# Patient Record
Sex: FEMALE | Race: WHITE | Employment: FULL TIME | URBAN - METROPOLITAN AREA
[De-identification: names, ages, dates, MRNs, and addresses within clinical notes are randomized per-mention and may not be internally consistent; named-entity substitution may affect disease eponyms.]

---

## 2017-06-24 ENCOUNTER — HOSPITAL ENCOUNTER (EMERGENCY)
Age: 22
Discharge: HOME | End: 2017-06-24
Attending: STUDENT IN AN ORGANIZED HEALTH CARE EDUCATION/TRAINING PROGRAM
Payer: COMMERCIAL

## 2017-06-24 ENCOUNTER — APPOINTMENT (OUTPATIENT)
Dept: GENERAL RADIOLOGY | Age: 22
End: 2017-06-24
Attending: STUDENT IN AN ORGANIZED HEALTH CARE EDUCATION/TRAINING PROGRAM
Payer: COMMERCIAL

## 2017-06-24 VITALS
TEMPERATURE: 98 F | OXYGEN SATURATION: 97 % | WEIGHT: 215 LBS | HEART RATE: 75 BPM | RESPIRATION RATE: 16 BRPM | HEIGHT: 62 IN | DIASTOLIC BLOOD PRESSURE: 80 MMHG | SYSTOLIC BLOOD PRESSURE: 114 MMHG | BODY MASS INDEX: 39.56 KG/M2

## 2017-06-24 DIAGNOSIS — M25.562 ACUTE PAIN OF LEFT KNEE: Primary | ICD-10-CM
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
## 2017-06-24 DIAGNOSIS — T14.8XXA ABRASION: ICD-10-CM

## 2017-06-24 LAB — HCG UR QL: NEGATIVE

## 2017-06-24 PROCEDURE — 73562 X-RAY EXAM OF KNEE 3: CPT

## 2017-06-24 PROCEDURE — 99283 EMERGENCY DEPT VISIT LOW MDM: CPT | Performed by: NURSE PRACTITIONER

## 2017-06-24 PROCEDURE — 81025 URINE PREGNANCY TEST: CPT

## 2017-06-24 NOTE — ED NOTES
Pt refused knee immobilizer, has one she would rather use. Crutches given with instruction. I have reviewed discharge instructions with the patient. The patient verbalized understanding.

## 2017-06-24 NOTE — ED PROVIDER NOTES
HPI Comments: 21 y/o f w hsx hip dysplasia and frequent falls to ed after her right ankle gave way and she landed on her left knee onto concrete curb. No other injury. Now with pain left knee. No right ankle pain. No other complaints. lmp due now    Patient is a 20 y.o. female presenting with knee injury. The history is provided by the patient. No  was used. Knee Injury    This is a new problem. The current episode started 3 to 5 hours ago. The problem occurs constantly. The problem has not changed since onset. The pain is present in the left knee. Pain severity now: mild to moderate. Associated symptoms include limited range of motion and stiffness. Pertinent negatives include no numbness, no tingling, no itching, no back pain and no neck pain. There has been a history of trauma.         Past Medical History:   Diagnosis Date    Anemia     Asthma     proair as needed; uses rarely    Broken bones     \"lots\" falls often    Hip dysplasia     bilateral and knee dysplasia    Ovarian cyst     cystadenofibroma    Unspecified adverse effect of anesthesia 2010    \"Went into cardiac arrest after waking up from anesthesia\"       Past Surgical History:   Procedure Laterality Date    HX KNEE ARTHROSCOPY Left     MCL; PCL    HX KNEE ARTHROSCOPY Right 1/5/15    MCL; PCL, filled micro fractures    HX PELVIC LAPAROSCOPY Right 5/6/2015    right ovarian cystectomy,chromopertubation,pelvic washings    HX WISDOM TEETH EXTRACTION           Family History:   Problem Relation Age of Onset    Cancer Mother      cervical    Endometriosis Mother     Hypertension Mother     Diabetes Mother      prediabetes    Diabetes Maternal Grandmother     Breast Cancer Maternal Grandmother [de-identified]    Ovarian Cancer Maternal Grandmother 36    Endometriosis Maternal Grandmother     Diabetes Maternal Grandfather     Diabetes Paternal Grandmother     Cancer Paternal Grandmother      uterine    Endometriosis Paternal Grandmother     Diabetes Paternal Grandfather     Cancer Paternal Grandfather      small cell ca    Other Paternal Grandfather      polio    Diabetes Maternal Aunt        Social History     Social History    Marital status:      Spouse name: N/A    Number of children: N/A    Years of education: N/A     Occupational History    Not on file. Social History Main Topics    Smoking status: Never Smoker    Smokeless tobacco: Never Used    Alcohol use No    Drug use: No    Sexual activity: Yes     Partners: Male     Birth control/ protection: Condom     Other Topics Concern    Not on file     Social History Narrative         ALLERGIES: Morphine; Shellfish containing products; Shellfish derived; Codeine; Doxycycline; Lortab [hydrocodone-acetaminophen]; Pcn [penicillins]; Strawberry; Tramadol; and Zithromax [azithromycin]    Review of Systems   Constitutional: Negative for chills and fever. HENT: Negative for ear pain and mouth sores. Eyes: Negative for discharge and redness. Respiratory: Negative for cough and shortness of breath. Cardiovascular: Negative for chest pain and palpitations. Gastrointestinal: Negative for abdominal pain, nausea and vomiting. Endocrine: Negative for cold intolerance and heat intolerance. Genitourinary: Negative for difficulty urinating and dysuria. Musculoskeletal: Positive for gait problem, joint swelling and stiffness. Negative for back pain and neck pain. Skin: Positive for wound (small superficial abrasion left knee). Negative for color change and itching. Neurological: Negative for dizziness, tingling, numbness and headaches. Psychiatric/Behavioral: Negative for confusion and decreased concentration. Vitals:    06/24/17 1130   BP: 114/80   Pulse: 75   Resp: 16   Temp: 98 °F (36.7 °C)   SpO2: 97%   Weight: 97.5 kg (215 lb)   Height: 5' 2\" (1.575 m)            Physical Exam   Constitutional: She is oriented to person, place, and time.  She appears well-developed and well-nourished. No distress. HENT:   Head: Normocephalic and atraumatic. Right Ear: External ear normal.   Left Ear: External ear normal.   Nose: Nose normal.   Eyes: Conjunctivae and EOM are normal. Pupils are equal, round, and reactive to light. Neck: Normal range of motion. Neck supple. Cardiovascular: Normal rate, regular rhythm and normal heart sounds. Pulmonary/Chest: Effort normal and breath sounds normal. No respiratory distress. She has no wheezes. Abdominal: Soft. Bowel sounds are normal. She exhibits no distension. There is no tenderness. Musculoskeletal: She exhibits edema and tenderness. Left knee: She exhibits swelling. Tenderness found. Medial joint line tenderness noted. Legs:  Neurological: She is alert and oriented to person, place, and time. No cranial nerve deficit. Coordination normal.   Skin: Skin is warm and dry. No rash noted. Psychiatric: She has a normal mood and affect. Her behavior is normal. Judgment and thought content normal.   Nursing note and vitals reviewed. MDM  Number of Diagnoses or Management Options  Diagnosis management comments: 19 y/o f w hip dysplasia to ed after fall onto her left knee - refuses offer of pain meds now. I tried to help her up to take a few steps and she has severe pain with wt bearing. Will eval xray. She follows with [facility] for her ortho needs  1:29 PM  Xray is neg. Will dc home with crutches and immobilizer and to see Dr Anais Simpson Monday.        Amount and/or Complexity of Data Reviewed  Tests in the radiology section of CPT®: ordered and reviewed    Risk of Complications, Morbidity, and/or Mortality  Presenting problems: minimal  Diagnostic procedures: minimal  Management options: minimal    Patient Progress  Patient progress: stable    ED Course       Procedures

## 2017-06-24 NOTE — DISCHARGE INSTRUCTIONS
Knee Pain or Injury: Care Instructions  Your Care Instructions    Injuries are a common cause of knee problems. Sudden (acute) injuries may be caused by a direct blow to the knee. They can also be caused by abnormal twisting, bending, or falling on the knee. Pain, bruising, or swelling may be severe, and may start within minutes of the injury. Overuse is another cause of knee pain. Other causes are climbing stairs, kneeling, and other activities that use the knee. Everyday wear and tear, especially as you get older, also can cause knee pain. Rest, along with home treatment, often relieves pain and allows your knee to heal. If you have a serious knee injury, you may need tests and treatment. Follow-up care is a key part of your treatment and safety. Be sure to make and go to all appointments, and call your doctor if you are having problems. It's also a good idea to know your test results and keep a list of the medicines you take. How can you care for yourself at home? · Be safe with medicines. Read and follow all instructions on the label. ¨ If the doctor gave you a prescription medicine for pain, take it as prescribed. ¨ If you are not taking a prescription pain medicine, ask your doctor if you can take an over-the-counter medicine. · Rest and protect your knee. Take a break from any activity that may cause pain. · Put ice or a cold pack on your knee for 10 to 20 minutes at a time. Put a thin cloth between the ice and your skin. · Prop up a sore knee on a pillow when you ice it or anytime you sit or lie down for the next 3 days. Try to keep it above the level of your heart. This will help reduce swelling. · If your knee is not swollen, you can put moist heat, a heating pad, or a warm cloth on your knee. · If your doctor recommends an elastic bandage, sleeve, or other type of support for your knee, wear it as directed.   · Follow your doctor's instructions about how much weight you can put on your leg. Use a cane, crutches, or a walker as instructed. · Follow your doctor's instructions about activity during your healing process. If you can do mild exercise, slowly increase your activity. · Reach and stay at a healthy weight. Extra weight can strain the joints, especially the knees and hips, and make the pain worse. Losing even a few pounds may help. When should you call for help? Call 911 anytime you think you may need emergency care. For example, call if:  · You have symptoms of a blood clot in your lung (called a pulmonary embolism). These may include:  ¨ Sudden chest pain. ¨ Trouble breathing. ¨ Coughing up blood. Call your doctor now or seek immediate medical care if:  · You have severe or increasing pain. · Your leg or foot turns cold or changes color. · You cannot stand or put weight on your knee. · Your knee looks twisted or bent out of shape. · You cannot move your knee. · You have signs of infection, such as:  ¨ Increased pain, swelling, warmth, or redness. ¨ Red streaks leading from the knee. ¨ Pus draining from a place on your knee. ¨ A fever. · You have signs of a blood clot in your leg (called a deep vein thrombosis), such as:  ¨ Pain in your calf, back of the knee, thigh, or groin. ¨ Redness and swelling in your leg or groin. Watch closely for changes in your health, and be sure to contact your doctor if:  · You have tingling, weakness, or numbness in your knee. · You have any new symptoms, such as swelling. · You have bruises from a knee injury that last longer than 2 weeks. · You do not get better as expected. Where can you learn more? Go to http://micaela-dariel.info/. Enter K195 in the search box to learn more about \"Knee Pain or Injury: Care Instructions. \"  Current as of: March 20, 2017  Content Version: 11.3  © 9202-1747 [employer].  Care instructions adapted under license by [employer] (which disclaims liability or warranty for this information). If you have questions about a medical condition or this instruction, always ask your healthcare professional. [address] any warranty or liability for your use of this information. Learning About RICE (Rest, Ice, Compression, and Elevation)  What is RICE? RICE is a way to care for an injury. RICE helps relieve pain and swelling. It may also help with healing and flexibility. RICE stands for:  · Rest and protect the injured or sore area. · Ice or a cold pack used as soon as possible. · Compression, or wrapping the injured or sore area with an elastic bandage. · Elevation (propping up) the injured or sore area. How do you do RICE? You can use RICE for home treatment when you have general aches and pains or after an injury or surgery. Rest  · Do not put weight on the injury for at least 24 to 48 hours. · Use crutches for a badly sprained knee or ankle. · Support a sprained wrist, elbow, or shoulder with a sling. Ice  · Put ice or a cold pack on the injury right away to reduce pain and swelling. Frozen vegetables will also work as an ice pack. Put a thin cloth between the ice or cold pack and your skin. The cloth protects the injured area from getting too cold. · Use ice for 10 to 15 minutes at a time for the first 48 to 72 hours. Compression  · Use compression for sprains, strains, and surgeries of the arms and legs. · Wrap the injured area with an elastic bandage or compression sleeve to reduce swelling. · Don't wrap it too tightly. If the area below it feels numb, tingles, or feels cool, loosen the wrap. Elevation  · Use elevation for areas of the body that can be propped up, such as arms and legs. · Prop up the injured area on pillows whenever you use ice. Keep it propped up anytime you sit or lie down. · Try to keep the injured area at or above the level of your heart. This will help reduce swelling and bruising. Where can you learn more?   Go to http://micaela-dariel.info/. Enter B451 in the search box to learn more about \"Learning About RICE (Rest, Ice, Compression, and Elevation). \"  Current as of: March 21, 2017  Content Version: 11.3  © 8959-0210 [employer]. Care instructions adapted under license by [employer]â„[employer] (which disclaims liability or warranty for this information). If you have questions about a medical condition or this instruction, always ask your healthcare professional. [place] [address] any warranty or liability for your use of this information.

## 2017-12-22 ENCOUNTER — HOSPITAL ENCOUNTER (EMERGENCY)
Age: 22
Discharge: HOME | End: 2017-12-22
Attending: EMERGENCY MEDICINE
Payer: COMMERCIAL

## 2017-12-22 VITALS
HEIGHT: 62 IN | HEART RATE: 102 BPM | BODY MASS INDEX: 39.38 KG/M2 | SYSTOLIC BLOOD PRESSURE: 110 MMHG | TEMPERATURE: 99 F | OXYGEN SATURATION: 99 % | WEIGHT: 214 LBS | DIASTOLIC BLOOD PRESSURE: 66 MMHG | RESPIRATION RATE: 16 BRPM

## 2017-12-22 DIAGNOSIS — R11.2 NON-INTRACTABLE VOMITING WITH NAUSEA, UNSPECIFIED VOMITING TYPE: Primary | ICD-10-CM

## 2017-12-22 LAB
ANION GAP SERPL CALC-SCNC: 14 MMOL/L (ref 7–16)
BACTERIA URNS QL MICRO: ABNORMAL /HPF
BUN SERPL-MCNC: 10 MG/DL (ref 6–23)
CALCIUM SERPL-MCNC: 9.1 MG/DL (ref 8.3–10.4)
CASTS URNS QL MICRO: 0 /LPF
CHLORIDE SERPL-SCNC: 100 MMOL/L (ref 98–107)
CO2 SERPL-SCNC: 23 MMOL/L (ref 21–32)
CREAT SERPL-MCNC: 0.83 MG/DL (ref 0.6–1)
CRYSTALS URNS QL MICRO: 0 /LPF
EPI CELLS #/AREA URNS HPF: ABNORMAL /HPF
GLUCOSE SERPL-MCNC: 100 MG/DL (ref 65–100)
MUCOUS THREADS URNS QL MICRO: ABNORMAL /LPF
POTASSIUM SERPL-SCNC: 3.8 MMOL/L (ref 3.5–5.1)
RBC #/AREA URNS HPF: ABNORMAL /HPF
SODIUM SERPL-SCNC: 137 MMOL/L (ref 136–145)
WBC URNS QL MICRO: ABNORMAL /HPF

## 2017-12-22 PROCEDURE — 99284 EMERGENCY DEPT VISIT MOD MDM: CPT | Performed by: EMERGENCY MEDICINE

## 2017-12-22 PROCEDURE — 81015 MICROSCOPIC EXAM OF URINE: CPT | Performed by: EMERGENCY MEDICINE

## 2017-12-22 PROCEDURE — 96374 THER/PROPH/DIAG INJ IV PUSH: CPT | Performed by: EMERGENCY MEDICINE

## 2017-12-22 PROCEDURE — 74011250636 HC RX REV CODE- 250/636: Performed by: EMERGENCY MEDICINE

## 2017-12-22 PROCEDURE — 96361 HYDRATE IV INFUSION ADD-ON: CPT | Performed by: EMERGENCY MEDICINE

## 2017-12-22 PROCEDURE — 74011000250 HC RX REV CODE- 250: Performed by: EMERGENCY MEDICINE

## 2017-12-22 PROCEDURE — 81003 URINALYSIS AUTO W/O SCOPE: CPT | Performed by: EMERGENCY MEDICINE

## 2017-12-22 PROCEDURE — 80048 BASIC METABOLIC PNL TOTAL CA: CPT | Performed by: EMERGENCY MEDICINE

## 2017-12-22 RX ORDER — PROGESTERONE 100 MG/1
100 CAPSULE ORAL DAILY
COMMUNITY
End: 2018-01-12

## 2017-12-22 RX ORDER — SODIUM CHLORIDE, SODIUM LACTATE, POTASSIUM CHLORIDE, CALCIUM CHLORIDE 600; 310; 30; 20 MG/100ML; MG/100ML; MG/100ML; MG/100ML
1000 INJECTION, SOLUTION INTRAVENOUS CONTINUOUS
Status: DISCONTINUED | OUTPATIENT
Start: 2017-12-22 | End: 2017-12-22 | Stop reason: HOSPADM

## 2017-12-22 RX ORDER — PROMETHAZINE HYDROCHLORIDE 25 MG/1
25 SUPPOSITORY RECTAL
Qty: 12 SUPPOSITORY | Refills: 0 | Status: SHIPPED | OUTPATIENT
Start: 2017-12-22 | End: 2018-01-12

## 2017-12-22 RX ORDER — PROMETHAZINE HYDROCHLORIDE 25 MG/1
25 TABLET ORAL
Qty: 12 TAB | Refills: 0 | Status: SHIPPED | OUTPATIENT
Start: 2017-12-22 | End: 2018-01-12

## 2017-12-22 RX ADMIN — SODIUM CHLORIDE 12.5 MG: 9 INJECTION INTRAMUSCULAR; INTRAVENOUS; SUBCUTANEOUS at 10:58

## 2017-12-22 RX ADMIN — SODIUM CHLORIDE, SODIUM LACTATE, POTASSIUM CHLORIDE, AND CALCIUM CHLORIDE 1000 ML: 600; 310; 30; 20 INJECTION, SOLUTION INTRAVENOUS at 10:58

## 2017-12-22 RX ADMIN — SODIUM CHLORIDE, POTASSIUM CHLORIDE, SODIUM LACTATE AND CALCIUM CHLORIDE 1000 ML: 600; 310; 30; 20 INJECTION, SOLUTION INTRAVENOUS at 13:27

## 2017-12-22 NOTE — ED TRIAGE NOTES
Patient advises about 6 weeks pregnant, 2nd pregnancy and 1 live birth at 22 weeks 2 days,  at 3 days. Patient advises placenta abruptio. Patient advises started having nausea and vomiting last night, small diarrhea last night. Patient also with upper abdominal pain and generalized body aches. Patient denies any bleeding or discharge.

## 2017-12-22 NOTE — DISCHARGE INSTRUCTIONS
Nausea and Vomiting: Care Instructions  Your Care Instructions    When you are nauseated, you may feel weak and sweaty and notice a lot of saliva in your mouth. Nausea often leads to vomiting. Most of the time you do not need to worry about nausea and vomiting, but they can be signs of other illnesses. Two common causes of nausea and vomiting are stomach flu and food poisoning. Nausea and vomiting from viral stomach flu will usually start to improve within 24 hours. Nausea and vomiting from food poisoning may last from 12 to 48 hours. The doctor has checked you carefully, but problems can develop later. If you notice any problems or new symptoms, get medical treatment right away. Follow-up care is a key part of your treatment and safety. Be sure to make and go to all appointments, and call your doctor if you are having problems. It's also a good idea to know your test results and keep a list of the medicines you take. How can you care for yourself at home? · To prevent dehydration, drink plenty of fluids, enough so that your urine is light yellow or clear like water. Choose water and other caffeine-free clear liquids until you feel better. If you have kidney, heart, or liver disease and have to limit fluids, talk with your doctor before you increase the amount of fluids you drink. · Rest in bed until you feel better. · When you are able to eat, try clear soups, mild foods, and liquids until all symptoms are gone for 12 to 48 hours. Other good choices include dry toast, crackers, cooked cereal, and gelatin dessert, such as Jell-O. When should you call for help? Call 911 anytime you think you may need emergency care. For example, call if:  ? · You passed out (lost consciousness). ?Call your doctor now or seek immediate medical care if:  ? · You have symptoms of dehydration, such as:  ¨ Dry eyes and a dry mouth. ¨ Passing only a little dark urine.   ¨ Feeling thirstier than usual.   ? · You have new or worsening belly pain. ? · You have a new or higher fever. ? · You vomit blood or what looks like coffee grounds. ? Watch closely for changes in your health, and be sure to contact your doctor if:  ? · You have ongoing nausea and vomiting. ? · Your vomiting is getting worse. ? · Your vomiting lasts longer than 2 days. ? · You are not getting better as expected. Where can you learn more? Go to http://micaela-darile.info/. Enter 25 [id number] in the search box to learn more about \"Nausea and Vomiting: Care Instructions. \"  Current as of: March 20, 2017  Content Version: 11.4  © 7323-4185 [employer]. Care instructions adapted under license by [employer] (which disclaims liability or warranty for this information). If you have questions about a medical condition or this instruction, always ask your healthcare professional. [address] any warranty or liability for your use of this information.   Recent Results (from the past 8 hour(s))   METABOLIC PANEL, BASIC    Collection Time: 12/22/17 10:43 AM   Result Value Ref Range    Sodium 137 136 - 145 mmol/L    Potassium 3.8 3.5 - 5.1 mmol/L    Chloride 100 98 - 107 mmol/L    CO2 23 21 - 32 mmol/L    Anion gap 14 7 - 16 mmol/L    Glucose 100 65 - 100 mg/dL    BUN 10 6 - 23 MG/DL    Creatinine 0.83 0.6 - 1.0 MG/DL    GFR est AA >60 >60 ml/min/1.73m2    GFR est non-AA >60 >60 ml/min/1.73m2    Calcium 9.1 8.3 - 10.4 MG/DL   URINE MICROSCOPIC    Collection Time: 12/22/17 10:55 AM   Result Value Ref Range    WBC 5-10 0 /hpf    RBC 3-5 0 /hpf    Epithelial cells 5-10 0 /hpf    Bacteria 1+ (H) 0 /hpf    Casts 0 0 /lpf    Crystals, urine 0 0 /LPF    Mucus 1+ (H) 0 /lpf

## 2017-12-22 NOTE — ED NOTES
I have reviewed discharge instructions with the patient. The patient verbalized understanding. Patient left ED via Discharge Method: ambulatory to Home with  family/friend,   Opportunity for questions and clarification provided. Patient given 2 scripts. To continue your aftercare when you leave the hospital, you may receive an automated call from our care team to check in on how you are doing. This is a free service and part of our promise to provide the best care and service to meet your aftercare needs.  If you have questions, or wish to unsubscribe from this service please call [phone]. Thank you for Choosing our [facility] Emergency Department.

## 2017-12-22 NOTE — ED PROVIDER NOTES
HPI Comments: 23-year-old female presents to the emergency department with nausea and vomiting. She is a  about 6 weeks pregnant. A dinner last night. Then started vomiting. Vomited throughout the night. Now having small amount of whatever she drinks,    No diarrhea. No fever. No alleviating. She states that multiple family members are sick with nausea vomiting illness. Past Medical History:   Diagnosis Date    Anemia     Asthma     proair as needed; uses rarely    Broken bones     \"lots\" falls often    Hip dysplasia     bilateral and knee dysplasia    Ovarian cyst     cystadenofibroma    Unspecified adverse effect of anesthesia     \"Went into cardiac arrest after waking up from anesthesia\"       Past Surgical History:   Procedure Laterality Date    HX KNEE ARTHROSCOPY Left     MCL; PCL    HX KNEE ARTHROSCOPY Right 1/5/15    MCL; PCL, filled micro fractures    HX PELVIC LAPAROSCOPY Right 2015    right ovarian cystectomy,chromopertubation,pelvic washings    HX WISDOM TEETH EXTRACTION           Family History:   Problem Relation Age of Onset    Cancer Mother      cervical    Endometriosis Mother     Hypertension Mother     Diabetes Mother      prediabetes    Diabetes Maternal Grandmother     Breast Cancer Maternal Grandmother [de-identified]    Ovarian Cancer Maternal Grandmother 36    Endometriosis Maternal Grandmother     Diabetes Maternal Grandfather     Diabetes Paternal Grandmother     Cancer Paternal Grandmother      uterine    Endometriosis Paternal Grandmother     Diabetes Paternal Grandfather     Cancer Paternal Grandfather      small cell ca    Other Paternal Grandfather      polio    Diabetes Maternal Aunt        Social History     Social History    Marital status:      Spouse name: N/A    Number of children: N/A    Years of education: N/A     Occupational History    Not on file.      Social History Main Topics    Smoking status: Never Smoker    Smokeless tobacco: Never Used    Alcohol use No    Drug use: No    Sexual activity: Yes     Partners: Male     Birth control/ protection: Condom     Other Topics Concern    Not on file     Social History Narrative         ALLERGIES: Morphine; Shellfish containing products; Shellfish derived; Codeine; Doxycycline; Lortab [hydrocodone-acetaminophen]; Pcn [penicillins]; Strawberry; Tramadol; and Zithromax [azithromycin]    Review of Systems   Constitutional: Negative for chills and fever. HENT: Negative. Respiratory: Negative. Cardiovascular: Negative. Gastrointestinal: Negative. Genitourinary: Negative. Musculoskeletal: Negative. Neurological: Negative. Psychiatric/Behavioral: Negative. Vitals:    12/22/17 1022   BP: 106/74   Pulse: (!) 108   Resp: 16   Temp: 98.3 °F (36.8 °C)   SpO2: 100%   Weight: 97.1 kg (214 lb)   Height: 5' 2\" (1.575 m)            Physical Exam   Constitutional: She is oriented to person, place, and time. She appears well-developed and well-nourished. No distress. HENT:   Head: Normocephalic and atraumatic. Eyes: EOM are normal. Pupils are equal, round, and reactive to light. Cardiovascular: Normal rate and regular rhythm. Pulmonary/Chest: Breath sounds normal. No respiratory distress. She has no wheezes. Abdominal: Soft. She exhibits no distension. There is no tenderness. Musculoskeletal: Normal range of motion. Neurological: She is alert and oriented to person, place, and time. Skin: Skin is warm and dry. Psychiatric: She has a normal mood and affect. Nursing note and vitals reviewed. MDM  Number of Diagnoses or Management Options  Diagnosis management comments: Low. Nontoxic 50-year-old female with nausea vomiting little bit of diarrhea    Multiple sick family members    Abdomen is soft nontender without rebound masses or guarding    No vaginal bleeding. No vaginal discharge    We'll hydrate her. Treat her nausea.   Reassess Amount and/or Complexity of Data Reviewed  Clinical lab tests: reviewed      ED Course       Procedures

## 2017-12-28 PROBLEM — T78.1XXA ALLERGIC REACTION TO FOOD: Status: ACTIVE | Noted: 2017-12-28

## 2018-01-12 ENCOUNTER — ANESTHESIA (OUTPATIENT)
Dept: SURGERY | Age: 23
End: 2018-01-12
Payer: COMMERCIAL

## 2018-01-12 ENCOUNTER — HOSPITAL ENCOUNTER (OUTPATIENT)
Age: 23
Setting detail: OUTPATIENT SURGERY
Discharge: HOME | End: 2018-01-12
Attending: OBSTETRICS & GYNECOLOGY | Admitting: OBSTETRICS & GYNECOLOGY
Payer: COMMERCIAL

## 2018-01-12 ENCOUNTER — ANESTHESIA EVENT (OUTPATIENT)
Dept: SURGERY | Age: 23
End: 2018-01-12
Payer: COMMERCIAL

## 2018-01-12 VITALS
HEIGHT: 62 IN | HEART RATE: 83 BPM | TEMPERATURE: 97 F | RESPIRATION RATE: 16 BRPM | SYSTOLIC BLOOD PRESSURE: 103 MMHG | DIASTOLIC BLOOD PRESSURE: 61 MMHG | OXYGEN SATURATION: 100 %

## 2018-01-12 DIAGNOSIS — G89.18 POST-OP PAIN: Primary | ICD-10-CM

## 2018-01-12 PROCEDURE — 76210000016 HC OR PH I REC 1 TO 1.5 HR: Performed by: OBSTETRICS & GYNECOLOGY

## 2018-01-12 PROCEDURE — 76010000138 HC OR TIME 0.5 TO 1 HR: Performed by: OBSTETRICS & GYNECOLOGY

## 2018-01-12 PROCEDURE — 77030008579 HC TBNG UTER SUC CARD -A: Performed by: OBSTETRICS & GYNECOLOGY

## 2018-01-12 PROCEDURE — 77030018836 HC SOL IRR NACL ICUM -A: Performed by: OBSTETRICS & GYNECOLOGY

## 2018-01-12 PROCEDURE — 77030020782 HC GWN BAIR PAWS FLX 3M -B: Performed by: NURSE ANESTHETIST, CERTIFIED REGISTERED

## 2018-01-12 PROCEDURE — 77030009368: Performed by: OBSTETRICS & GYNECOLOGY

## 2018-01-12 PROCEDURE — 74011000250 HC RX REV CODE- 250

## 2018-01-12 PROCEDURE — 76210000020 HC REC RM PH II FIRST 0.5 HR: Performed by: OBSTETRICS & GYNECOLOGY

## 2018-01-12 PROCEDURE — 74011250636 HC RX REV CODE- 250/636

## 2018-01-12 PROCEDURE — 76060000032 HC ANESTHESIA 0.5 TO 1 HR: Performed by: OBSTETRICS & GYNECOLOGY

## 2018-01-12 PROCEDURE — 74011250636 HC RX REV CODE- 250/636: Performed by: ANESTHESIOLOGY

## 2018-01-12 PROCEDURE — 88305 TISSUE EXAM BY PATHOLOGIST: CPT | Performed by: OBSTETRICS & GYNECOLOGY

## 2018-01-12 PROCEDURE — 77030020143 HC AIRWY LARYN INTUB CGAS -A: Performed by: NURSE ANESTHETIST, CERTIFIED REGISTERED

## 2018-01-12 RX ORDER — NALOXONE HYDROCHLORIDE 0.4 MG/ML
0.1 INJECTION, SOLUTION INTRAMUSCULAR; INTRAVENOUS; SUBCUTANEOUS AS NEEDED
Status: DISCONTINUED | OUTPATIENT
Start: 2018-01-12 | End: 2018-01-12 | Stop reason: HOSPADM

## 2018-01-12 RX ORDER — DEXAMETHASONE SODIUM PHOSPHATE 4 MG/ML
INJECTION, SOLUTION INTRA-ARTICULAR; INTRALESIONAL; INTRAMUSCULAR; INTRAVENOUS; SOFT TISSUE AS NEEDED
Status: DISCONTINUED | OUTPATIENT
Start: 2018-01-12 | End: 2018-01-12 | Stop reason: HOSPADM

## 2018-01-12 RX ORDER — PROPOFOL 10 MG/ML
INJECTION, EMULSION INTRAVENOUS AS NEEDED
Status: DISCONTINUED | OUTPATIENT
Start: 2018-01-12 | End: 2018-01-12 | Stop reason: HOSPADM

## 2018-01-12 RX ORDER — ACETAMINOPHEN 10 MG/ML
1000 INJECTION, SOLUTION INTRAVENOUS ONCE
Status: COMPLETED | OUTPATIENT
Start: 2018-01-12 | End: 2018-01-12

## 2018-01-12 RX ORDER — LIDOCAINE HYDROCHLORIDE 20 MG/ML
INJECTION, SOLUTION EPIDURAL; INFILTRATION; INTRACAUDAL; PERINEURAL AS NEEDED
Status: DISCONTINUED | OUTPATIENT
Start: 2018-01-12 | End: 2018-01-12 | Stop reason: HOSPADM

## 2018-01-12 RX ORDER — DIPHENHYDRAMINE HYDROCHLORIDE 50 MG/ML
12.5 INJECTION, SOLUTION INTRAMUSCULAR; INTRAVENOUS ONCE
Status: DISCONTINUED | OUTPATIENT
Start: 2018-01-12 | End: 2018-01-12 | Stop reason: HOSPADM

## 2018-01-12 RX ORDER — KETOROLAC TROMETHAMINE 10 MG/1
10 TABLET, FILM COATED ORAL EVERY 6 HOURS
Qty: 10 TAB | Refills: 0 | Status: SHIPPED | OUTPATIENT
Start: 2018-01-12 | End: 2018-01-15

## 2018-01-12 RX ORDER — FENTANYL CITRATE 50 UG/ML
INJECTION, SOLUTION INTRAMUSCULAR; INTRAVENOUS AS NEEDED
Status: DISCONTINUED | OUTPATIENT
Start: 2018-01-12 | End: 2018-01-12 | Stop reason: HOSPADM

## 2018-01-12 RX ORDER — SODIUM CHLORIDE, SODIUM LACTATE, POTASSIUM CHLORIDE, CALCIUM CHLORIDE 600; 310; 30; 20 MG/100ML; MG/100ML; MG/100ML; MG/100ML
75 INJECTION, SOLUTION INTRAVENOUS CONTINUOUS
Status: DISCONTINUED | OUTPATIENT
Start: 2018-01-12 | End: 2018-01-12 | Stop reason: HOSPADM

## 2018-01-12 RX ORDER — ONDANSETRON 2 MG/ML
4 INJECTION INTRAMUSCULAR; INTRAVENOUS ONCE
Status: DISCONTINUED | OUTPATIENT
Start: 2018-01-12 | End: 2018-01-12 | Stop reason: HOSPADM

## 2018-01-12 RX ORDER — KETOROLAC TROMETHAMINE 30 MG/ML
INJECTION, SOLUTION INTRAMUSCULAR; INTRAVENOUS AS NEEDED
Status: DISCONTINUED | OUTPATIENT
Start: 2018-01-12 | End: 2018-01-12 | Stop reason: HOSPADM

## 2018-01-12 RX ORDER — PROMETHAZINE HYDROCHLORIDE 12.5 MG/1
12.5-25 TABLET ORAL
Qty: 10 TAB | Refills: 1 | Status: SHIPPED | OUTPATIENT
Start: 2018-01-12 | End: 2018-01-19

## 2018-01-12 RX ORDER — MIDAZOLAM HYDROCHLORIDE 1 MG/ML
2 INJECTION, SOLUTION INTRAMUSCULAR; INTRAVENOUS
Status: DISCONTINUED | OUTPATIENT
Start: 2018-01-12 | End: 2018-01-12 | Stop reason: HOSPADM

## 2018-01-12 RX ORDER — ONDANSETRON 2 MG/ML
INJECTION INTRAMUSCULAR; INTRAVENOUS AS NEEDED
Status: DISCONTINUED | OUTPATIENT
Start: 2018-01-12 | End: 2018-01-12 | Stop reason: HOSPADM

## 2018-01-12 RX ORDER — METHYLERGONOVINE MALEATE 0.2 MG/1
0.2 TABLET ORAL EVERY 4 HOURS
Qty: 5 TAB | Refills: 0 | Status: SHIPPED | OUTPATIENT
Start: 2018-01-12 | End: 2018-01-13

## 2018-01-12 RX ORDER — SODIUM CHLORIDE, SODIUM LACTATE, POTASSIUM CHLORIDE, CALCIUM CHLORIDE 600; 310; 30; 20 MG/100ML; MG/100ML; MG/100ML; MG/100ML
1000 INJECTION, SOLUTION INTRAVENOUS CONTINUOUS
Status: DISCONTINUED | OUTPATIENT
Start: 2018-01-12 | End: 2018-01-12 | Stop reason: HOSPADM

## 2018-01-12 RX ORDER — LIDOCAINE HYDROCHLORIDE 10 MG/ML
0.1 INJECTION INFILTRATION; PERINEURAL AS NEEDED
Status: DISCONTINUED | OUTPATIENT
Start: 2018-01-12 | End: 2018-01-12 | Stop reason: HOSPADM

## 2018-01-12 RX ORDER — DOXYCYCLINE 100 MG/1
100 TABLET ORAL 2 TIMES DAILY
Qty: 10 TAB | Refills: 0 | Status: SHIPPED | OUTPATIENT
Start: 2018-01-12 | End: 2018-01-17

## 2018-01-12 RX ORDER — SODIUM CHLORIDE 0.9 % (FLUSH) 0.9 %
5-10 SYRINGE (ML) INJECTION AS NEEDED
Status: DISCONTINUED | OUTPATIENT
Start: 2018-01-12 | End: 2018-01-12 | Stop reason: HOSPADM

## 2018-01-12 RX ORDER — FENTANYL CITRATE 50 UG/ML
100 INJECTION, SOLUTION INTRAMUSCULAR; INTRAVENOUS AS NEEDED
Status: DISCONTINUED | OUTPATIENT
Start: 2018-01-12 | End: 2018-01-12 | Stop reason: HOSPADM

## 2018-01-12 RX ORDER — SODIUM CHLORIDE 0.9 % (FLUSH) 0.9 %
5-10 SYRINGE (ML) INJECTION EVERY 8 HOURS
Status: DISCONTINUED | OUTPATIENT
Start: 2018-01-12 | End: 2018-01-12 | Stop reason: HOSPADM

## 2018-01-12 RX ADMIN — MIDAZOLAM HYDROCHLORIDE 2 MG: 1 INJECTION, SOLUTION INTRAMUSCULAR; INTRAVENOUS at 13:22

## 2018-01-12 RX ADMIN — DEXAMETHASONE SODIUM PHOSPHATE 10 MG: 4 INJECTION, SOLUTION INTRA-ARTICULAR; INTRALESIONAL; INTRAMUSCULAR; INTRAVENOUS; SOFT TISSUE at 13:53

## 2018-01-12 RX ADMIN — LIDOCAINE HYDROCHLORIDE 100 MG: 20 INJECTION, SOLUTION EPIDURAL; INFILTRATION; INTRACAUDAL; PERINEURAL at 13:49

## 2018-01-12 RX ADMIN — FENTANYL CITRATE 25 MCG: 50 INJECTION, SOLUTION INTRAMUSCULAR; INTRAVENOUS at 13:51

## 2018-01-12 RX ADMIN — FENTANYL CITRATE 25 MCG: 50 INJECTION, SOLUTION INTRAMUSCULAR; INTRAVENOUS at 13:57

## 2018-01-12 RX ADMIN — KETOROLAC TROMETHAMINE 30 MG: 30 INJECTION, SOLUTION INTRAMUSCULAR; INTRAVENOUS at 14:03

## 2018-01-12 RX ADMIN — ONDANSETRON 4 MG: 2 INJECTION INTRAMUSCULAR; INTRAVENOUS at 13:53

## 2018-01-12 RX ADMIN — ACETAMINOPHEN 1000 MG: 10 INJECTION, SOLUTION INTRAVENOUS at 14:46

## 2018-01-12 RX ADMIN — FENTANYL CITRATE 50 MCG: 50 INJECTION, SOLUTION INTRAMUSCULAR; INTRAVENOUS at 13:35

## 2018-01-12 RX ADMIN — SODIUM CHLORIDE, SODIUM LACTATE, POTASSIUM CHLORIDE, AND CALCIUM CHLORIDE 1000 ML: 600; 310; 30; 20 INJECTION, SOLUTION INTRAVENOUS at 12:48

## 2018-01-12 RX ADMIN — PROPOFOL 200 MG: 10 INJECTION, EMULSION INTRAVENOUS at 13:49

## 2018-01-12 NOTE — IP AVS SNAPSHOT
Yfn Khan 
 
 
 [address] [id number] 
[phone] Patient: Otto Dudley MRN: [id number] :1995 About your hospitalization You were admitted on:  2018 You last received care in the:  [facility] PACU You were discharged on:  2018 Why you were hospitalized Your primary diagnosis was:  Not on File Follow-up Information Follow up With Details Comments Contact Info Jen Jules MD Schedule an appointment as soon as possible for a visit in 2 week(s) CALL TO SCHEDULE FOLLOW UP APPOINTMENT IF NOT ALREADY SCHEDULED [address] [facility] [address] 
[phone] Luca Alonzo MD   [address] [place] [id number] [phone] Discharge Orders None A check rosalina indicates which time of day the medication should be taken. My Medications START taking these medications Instructions Each Dose to Equal  
 Morning Noon Evening Bedtime  
 doxycycline 100 mg tablet Commonly known as:  ADOXA Your last dose was: Your next dose is: Take 1 Tab by mouth two (2) times a day for 5 days. 100 mg  
    
   
   
   
  
 ketorolac 10 mg tablet Commonly known as:  TORADOL Your last dose was: Your next dose is: Take 1 Tab by mouth every six (6) hours for 10 doses. 10 mg  
    
   
   
   
  
 methylergonovine 0.2 mg tablet Commonly known as:  METHERGINE Your last dose was: Your next dose is: Take 1 Tab by mouth every four (4) hours for 5 doses. 0.2 mg  
    
   
   
   
  
  
CHANGE how you take these medications Instructions Each Dose to Equal  
 Morning Noon Evening Bedtime  
 promethazine 12.5 mg tablet Commonly known as:  PHENERGAN What changed:   
- medication strength 
- how much to take 
- reasons to take this - Another medication with the same name was removed. Continue taking this medication, and follow the directions you see here. Your last dose was: Your next dose is: Take 1-2 Tabs by mouth every six (6) hours as needed for Nausea for up to 7 days. 12.5-25 mg CONTINUE taking these medications Instructions Each Dose to Equal  
 Morning Noon Evening Bedtime PRENATAL #2 PO Your last dose was: Your next dose is: Take  by mouth. STOP taking these medications   
 progesterone 100 mg capsule Commonly known as:  PROMETRIUM Where to Get Your Medications Information on where to get these meds will be given to you by the nurse or doctor. ! Ask your nurse or doctor about these medications  
  doxycycline 100 mg tablet  
 ketorolac 10 mg tablet  
 methylergonovine 0.2 mg tablet  
 promethazine 12.5 mg tablet Discharge Instructions Dilation and Curettage: What to Expect at [facility] Your Recovery Dilation and curettage (D&C) is a procedure to remove tissue from the inside of the uterus. The doctor used a curved tool, called a curette, to gently scrape tissue from your uterus. You are likely to have a backache, or cramps similar to menstrual cramps, and pass small clots of blood from your vagina for the first few days. You may continue to have light vaginal bleeding for several weeks after the procedure. You will probably be able to go back to most of your normal activities in 1 or 2 days. This care sheet gives you a general idea about how long it will take for you to recover. But each person recovers at a different pace. Follow the steps below to get better as quickly as possible. How can you care for yourself at home? Activity ? · Rest when you feel tired. Getting enough sleep will help you recover. ? · Avoid strenuous activities, such as bicycle riding, jogging, weight lifting, or aerobic exercise, until your doctor says it is okay. ? · Most women are able to return to work the day after the procedure. ? · You may have some light vaginal bleeding. Wear sanitary pads if needed. Do not douche or use tampons for 2 weeks or until your doctor says it is okay. ? · Ask your doctor when it is okay for you to have sex. ? · If you could become pregnant, talk about birth control with your doctor. Do not try to become pregnant until your doctor says it is okay. Diet ? · You can eat your normal diet. If your stomach is upset, try bland, low-fat foods like plain rice, broiled chicken, toast, and yogurt. ? · Drink plenty of fluids (unless your doctor tells you not to). Medicines ? · Your doctor will tell you if and when you can restart your medicines. He or she will also give you instructions about taking any new medicines. ? · If you take blood thinners, such as warfarin (Coumadin), clopidogrel (Plavix), or aspirin, be sure to talk to your doctor. He or she will tell you if and when to start taking those medicines again. Make sure that you understand exactly what your doctor wants you to do. ? · Be safe with medicines. Take pain medicines exactly as directed. ¨ If the doctor gave you a prescription medicine for pain, take it as prescribed. ¨ If you are not taking a prescription pain medicine, ask your doctor if you can take an over-the-counter medicine. ? · If you think your pain medicine is making you sick to your stomach: 
¨ Take your medicine after meals (unless your doctor has told you not to). ¨ Ask your doctor for a different pain medicine. ? · If your doctor prescribed antibiotics, take them as directed. Do not stop taking them just because you feel better. You need to take the full course of antibiotics. Follow-up care is a key part of your treatment and safety.  Be sure to make and go to all appointments, and call your doctor if you are having problems. It's also a good idea to know your test results and keep a list of the medicines you take. When should you call for help? Call 911 anytime you think you may need emergency care. For example, call if: 
? · You passed out (lost consciousness). ? · You have chest pain, are short of breath, or cough up blood. ?Call your doctor now or seek immediate medical care if: 
? · You have bright red vaginal bleeding that soaks one or more pads in an hour, or you have large clots. ? · You have vaginal discharge that increases in amount or smells bad.  
? · You are sick to your stomach or cannot drink fluids. ? · You have pain that does not get better after you take pain medicine. ? · You cannot pass stools or gas. ? · You have symptoms of a blood clot in your leg (called a deep vein thrombosis), such as: 
¨ Pain in your calf, back of the knee, thigh, or groin. ¨ Redness and swelling in your leg. ? · You have signs of infection, such as: 
¨ Increased pain, swelling, warmth, or redness. ¨ Red streaks leading from the area. ¨ Pus draining from the area. ¨ A fever. ? Watch closely for changes in your health, and be sure to contact your doctor if you have any problems. Where can you learn more? Go to http://micaela-dariel.info/. Enter [phone] in the search box to learn more about \"Dilation and Curettage: What to Expect at Home. \" Current as of: March 16, 2017 Content Version: 11.4 © 2001-8583 Healthwise, Incorporated. Care instructions adapted under license by [employer] (which disclaims liability or warranty for this information). If you have questions about a medical condition or this instruction, always ask your healthcare professional. [address] any warranty or liability for your use of this information. Introducing [facility]! Dear Sydney Martínez: Thank you for requesting a [employer] account. Our records indicate that you already have an active [employer] account. You can access your account anytime at [email or web address] Did you know that you can access your hospital and ER discharge instructions at any time in [employer]? You can also review all of your test results from your hospital stay or ER visit. Additional Information If you have questions, please visit the Frequently Asked Questions section of the [employer] website at [email or web address] Remember, [employer] is NOT to be used for urgent needs. For medical emergencies, dial 911. Now available from your iPhone and Android! Providers Seen During Your Hospitalization Provider Specialty Primary office phone Sayra Arguello MD Obstetrics & Gynecology [phone] Your Primary Care Physician (PCP) Primary Care Physician Office Phone Office Fax 224 [address] [phone] You are allergic to the following Allergen Reactions Morphine Anaphylaxis Shellfish Containing Products Shortness of Breath Shellfish Derived Hives Swelling Codeine Hives Swelling Doxycycline Hives Nausea and Vomiting Swelling Lortab (Hydrocodone-Acetaminophen) Hives Nausea and Vomiting Pcn (Penicillins) Hives Swelling Strawberry Hives Nausea and Vomiting Tramadol Hives Zithromax (Azithromycin) Hives Swelling Recent Documentation Height OB Status Smoking Status 1.575 m Having regular periods Never Smoker Emergency Contacts Name Discharge Info Relation Home Work Mobile Casey Shepherd  Spouse [3] [phone] Patient Belongings The following personal items are in your possession at time of discharge: 
  Dental Appliances: None Please provide this summary of care documentation to your next provider. Signatures-by signing, you are acknowledging that this After Visit Summary has been reviewed with you and you have received a copy. Patient Signature:  ____________________________________________________________ Date:  ____________________________________________________________  
  
Phyliss Ghee Provider Signature:  ____________________________________________________________ Date:  ____________________________________________________________

## 2018-01-12 NOTE — BRIEF OP NOTE
BRIEF OPERATIVE NOTE    Date of Procedure: 1/12/2018   Preoperative Diagnosis: MISSED AB     Postoperative Diagnosis: MISSED AB    Procedure(s):  DILATATION AND CURETTAGE WITH SUCTION   Surgeon(s) and Role:     * Angelica Pond MD - Primary         Assistant Staff:       Surgical Staff:  Circ-1: Dandre Marti RN  Circ-2: Ginette Scales RN  Scrub Tech-1: Nanette Faith  Event Time In   Incision Start  1:54 PM   Incision Close  2:04 PM     Anesthesia: General   Estimated Blood Loss: <20cc  Specimens:   ID Type Source Tests Collected by Time Destination   1 : Products of conception Janny Oquendo MD 1/12/2018  1:58 PM Pathology      Findings: as above , see dictation #[id number]  Complications: none  Implants: * No implants in log *

## 2018-01-12 NOTE — DISCHARGE INSTRUCTIONS
Dilation and Curettage: What to Expect at Home  Your Recovery  Dilation and curettage (D&C) is a procedure to remove tissue from the inside of the uterus. The doctor used a curved tool, called a curette, to gently scrape tissue from your uterus. You are likely to have a backache, or cramps similar to menstrual cramps, and pass small clots of blood from your vagina for the first few days. You may continue to have light vaginal bleeding for several weeks after the procedure. You will probably be able to go back to most of your normal activities in 1 or 2 days. This care sheet gives you a general idea about how long it will take for you to recover. But each person recovers at a different pace. Follow the steps below to get better as quickly as possible. How can you care for yourself at home? Activity  ? · Rest when you feel tired. Getting enough sleep will help you recover. ? · Avoid strenuous activities, such as bicycle riding, jogging, weight lifting, or aerobic exercise, until your doctor says it is okay. ? · Most women are able to return to work the day after the procedure. ? · You may have some light vaginal bleeding. Wear sanitary pads if needed. Do not douche or use tampons for 2 weeks or until your doctor says it is okay. ? · Ask your doctor when it is okay for you to have sex. ? · If you could become pregnant, talk about birth control with your doctor. Do not try to become pregnant until your doctor says it is okay. Diet  ? · You can eat your normal diet. If your stomach is upset, try bland, low-fat foods like plain rice, broiled chicken, toast, and yogurt. ? · Drink plenty of fluids (unless your doctor tells you not to). Medicines  ? · Your doctor will tell you if and when you can restart your medicines. He or she will also give you instructions about taking any new medicines.    ? · If you take blood thinners, such as warfarin (Coumadin), clopidogrel (Plavix), or aspirin, be sure to talk to your doctor. He or she will tell you if and when to start taking those medicines again. Make sure that you understand exactly what your doctor wants you to do. ? · Be safe with medicines. Take pain medicines exactly as directed. ¨ If the doctor gave you a prescription medicine for pain, take it as prescribed. ¨ If you are not taking a prescription pain medicine, ask your doctor if you can take an over-the-counter medicine. ? · If you think your pain medicine is making you sick to your stomach:  ¨ Take your medicine after meals (unless your doctor has told you not to). ¨ Ask your doctor for a different pain medicine. ? · If your doctor prescribed antibiotics, take them as directed. Do not stop taking them just because you feel better. You need to take the full course of antibiotics. Follow-up care is a key part of your treatment and safety. Be sure to make and go to all appointments, and call your doctor if you are having problems. It's also a good idea to know your test results and keep a list of the medicines you take. When should you call for help? Call 911 anytime you think you may need emergency care. For example, call if:  ? · You passed out (lost consciousness). ? · You have chest pain, are short of breath, or cough up blood. ?Call your doctor now or seek immediate medical care if:  ? · You have bright red vaginal bleeding that soaks one or more pads in an hour, or you have large clots. ? · You have vaginal discharge that increases in amount or smells bad.   ? · You are sick to your stomach or cannot drink fluids. ? · You have pain that does not get better after you take pain medicine. ? · You cannot pass stools or gas. ? · You have symptoms of a blood clot in your leg (called a deep vein thrombosis), such as:  ¨ Pain in your calf, back of the knee, thigh, or groin. ¨ Redness and swelling in your leg.    ? · You have signs of infection, such as:  ¨ Increased pain, swelling, warmth, or redness. ¨ Red streaks leading from the area. ¨ Pus draining from the area. ¨ A fever. ? Watch closely for changes in your health, and be sure to contact your doctor if you have any problems. Where can you learn more? Go to http://micaela-dariel.info/. Enter [phone] in the search box to learn more about \"Dilation and Curettage: What to Expect at Home. \"  Current as of: March 16, 2017  Content Version: 11.4  © 6663-9285 [employer]. Care instructions adapted under license by [employer] (which disclaims liability or warranty for this information). If you have questions about a medical condition or this instruction, always ask your healthcare professional. [address] any warranty or liability for your use of this information.

## 2018-01-12 NOTE — H&P
Gynecology History and Physical    Name: Anabel Brambila MRN: [id number] [id number]: [id number]    YOB: 1995  Age: 20 y.o. Sex: female       Subjective:      Chief complaint:  Missed     Romana Donaldson is a 20 y.o.  female with a history of endometrial thickening. Previous workup included Ultrasound which revealed no FHM. Previous treatment measures included observation. She is admitted for Procedure(s) (LRB):  DILATATION AND CURETTAGE WITH SUCTION / 8 WEEKS 2 DAYS / AB+ (N/A). The current method of family planning is none. OB History      Para Term  AB Living    1 0 0 0 0 0    SAB TAB Ectopic Molar Multiple Live Births    0 0 0  0         Past Medical History:   Diagnosis Date    Anemia     Asthma     proair as needed; uses rarely    Broken bones     \"lots\" falls often    Hip dysplasia     bilateral and knee dysplasia    Ovarian cyst     cystadenofibroma    Unspecified adverse effect of anesthesia     \"Went into cardiac arrest after waking up from anesthesia\"     Past Surgical History:   Procedure Laterality Date    HX KNEE ARTHROSCOPY Left     MCL; PCL    HX KNEE ARTHROSCOPY Right 1/5/15    MCL; PCL, filled micro fractures    HX PELVIC LAPAROSCOPY Right 2015    right ovarian cystectomy,chromopertubation,pelvic washings    HX WISDOM TEETH EXTRACTION       Social History     Occupational History    Not on file.      Social History Main Topics    Smoking status: Never Smoker    Smokeless tobacco: Never Used    Alcohol use No    Drug use: No    Sexual activity: Yes     Partners: Male     Birth control/ protection: Condom     Family History   Problem Relation Age of Onset    Cancer Mother      cervical    Endometriosis Mother     Hypertension Mother     Diabetes Mother      prediabetes    Diabetes Maternal Grandmother     Breast Cancer Maternal Grandmother [de-identified]    Ovarian Cancer Maternal Grandmother 36    Endometriosis Maternal Grandmother     Diabetes Maternal Grandfather     Diabetes Paternal Grandmother     Cancer Paternal Grandmother      uterine    Endometriosis Paternal Grandmother     Diabetes Paternal Grandfather     Cancer Paternal Grandfather      small cell ca    Other Paternal Grandfather      polio    Diabetes Maternal Aunt         Allergies   Allergen Reactions    Morphine Anaphylaxis    Shellfish Containing Products Shortness of Breath    Shellfish Derived Hives and Swelling    Codeine Hives and Swelling    Doxycycline Hives, Nausea and Vomiting and Swelling    Lortab [Hydrocodone-Acetaminophen] Hives and Nausea and Vomiting    Pcn [Penicillins] Hives and Swelling    Strawberry Hives and Nausea and Vomiting    Tramadol Hives    Zithromax [Azithromycin] Hives and Swelling     Prior to Admission medications    Medication Sig Start Date End Date Taking? Authorizing Provider   PRENATAL VIT CALC,IRON,FOLIC (PRENATAL #2 PO) Take  by mouth. Yes Historical Provider   progesterone (PROMETRIUM) 100 mg capsule Take 100 mg by mouth daily. Yes Phys Naun, MD   promethazine (PHENERGAN) 25 mg tablet Take 1 Tab by mouth every six (6) hours as needed for up to 15 doses. 12/22/17   Brain Sinha MD   promethazine (PHENERGAN) 25 mg suppository Insert 1 Suppository into rectum every six (6) hours as needed for Nausea for up to 10 doses. 12/22/17   Brain Sinha MD        Review of Systems:  A comprehensive review of systems was negative except for that written in the History of Present Illness. Objective:     Vitals:    01/12/18 1214   BP: 113/67   Pulse: 83   Resp: 18   Temp: 99 °F (37.2 °C)   SpO2: 98%   Height: 5' 2\" (1.575 m)       Physical Exam:  Patient without distress. Heart: Regular rate and rhythm  Lung: clear to auscultation throughout lung fields, no wheezes, no rales, no rhonchi and normal respiratory effort  Abdomen: soft, nontender    Assessment:     Active Problems:    * No active hospital problems.  *     Missed  with thickened endometrium    Plan:     Procedure(s) (LRB):  DILATATION AND CURETTAGE WITH SUCTION / 8 WEEKS 2 DAYS / AB+ (N/A)  Discussed the risks of surgery including the risks of bleeding, infection, deep vein thrombosis, and surgical injuries to internal organs including but not limited to the bowels, bladder, rectum, and female reproductive organs. The patient understands the risks; any and all questions were answered to the patient's satisfaction.     Signed By:  La Nena Gordon MD     2018

## 2018-01-12 NOTE — ANESTHESIA POSTPROCEDURE EVALUATION
Post-Anesthesia Evaluation and Assessment    Patient: Sho Easley MRN: [id number]  SSN: [id number]    YOB: 1995  Age: 20 y.o. Sex: female       Cardiovascular Function/Vital Signs  Visit Vitals    /61 (BP 1 Location: Right arm, BP Patient Position: At rest)    Pulse 83    Temp 36.1 °C (97 °F)    Resp 16    Ht 5' 2\" (1.575 m)    SpO2 100%       Patient is status post general anesthesia for Procedure(s):  DILATATION AND CURETTAGE WITH SUCTION . Nausea/Vomiting: None    Postoperative hydration reviewed and adequate. Pain:  Pain Scale 1: Numeric (0 - 10) (01/12/18 1446)  Pain Intensity 1: 4 (01/12/18 1446)   Managed    Neurological Status:   Neuro (WDL): Within Defined Limits (01/12/18 1446)  Neuro  Neurologic State: Alert (01/12/18 1446)  Orientation Level: Oriented X4 (01/12/18 1446)  Cognition: Decreased attention/concentration; Follows commands (01/12/18 1412)   At baseline    Mental Status and Level of Consciousness: Arousable    Pulmonary Status:   O2 Device: Room air (01/12/18 1501)   Adequate oxygenation and airway patent    Complications related to anesthesia: None    Post-anesthesia assessment completed.  No concerns    Signed By: Sanford Onofre MD     January 12, 2018

## 2018-01-12 NOTE — PROGRESS NOTES
Patient experienced a  loss. Jose J Rashid ministered to the patient and her family. The patient's information was recorded and left for [facility]. The patient was given the materials to assist her with  her loss.      [employer]

## 2018-01-12 NOTE — ANESTHESIA PREPROCEDURE EVALUATION
Anesthetic History   No history of anesthetic complications            Review of Systems / Medical History  Patient summary reviewed and pertinent labs reviewed    Pulmonary            Asthma : well controlled       Neuro/Psych   Within defined limits           Cardiovascular  Within defined limits                Exercise tolerance: >4 METS     GI/Hepatic/Renal  Within defined limits              Endo/Other        Morbid obesity     Other Findings   Comments: \"Cardiac Arrest in PACU\" per Pt           Physical Exam    Airway  Mallampati: II  TM Distance: 4 - 6 cm         Cardiovascular    Rhythm: regular  Rate: normal         Dental  No notable dental hx       Pulmonary  Breath sounds clear to auscultation               Abdominal  GI exam deferred       Other Findings            Anesthetic Plan    ASA: 3  Anesthesia type: general          Induction: Intravenous  Anesthetic plan and risks discussed with: Patient

## 2018-01-12 NOTE — OP NOTES
[address] REPORT    Becca Paz  MR#: [id number]  : 1995  ACCOUNT #: [de-identified]   DATE OF SERVICE: 2018    PREOPERATIVE DIAGNOSIS:  Missed . POSTOPERATIVE DIAGNOSIS:  Missed . PROCEDURES PERFORMED:  Dilatation and evacuation. SURGEON:  Phil Bajwa MD    ANESTHESIA:  General.    ESTIMATED BLOOD LOSS:  Less than 20 mL. COMPLICATIONS:  None. DRAINS:  In and out catheterization. SPECIMENS REMOVED:  Products of conception to Pathology. FINDINGS:  As above. INDICATIONS:  The patient with previous ultrasound one week ago with patent fetal heart rate and repeat followup ultrasound now with no fetal heart rate and documented fetal demise. PROCEDURE:  After informed consent was obtained, the patient was taken to OR and general anesthetic administered. She was prepped and draped in sterile fashion. Weighted speculum placed in the vagina after timeout had been performed and the cervix grasped with single tooth tenaculum after uterine sounding. Circumferential dilation was performed to allow for the 7 curet to be placed up inside. Circumferential suction was performed until the uterine cry was felt in all four cavities and bleeding controlled. The patient received a couple ampules of Pitocin IV, which helped to control the bleeding also. Good hemostasis was obtained from the tenaculum marks. All instruments and sponge counts were correct x2. The patient went to recovery room in good condition and specimens to Pathology. PLAN:  Patient will go home with some Phenergan for nausea and vomiting, some doxycycline to cut down the risk of infection. PATIENT WITH HISTORY OF ALLERGY TO DOXYCYCLINE, but further discussion, this was more consistent with nausea created by taking on an empty stomach. Subsequently, will try the doxycycline to cut down infection. We will also go home with Methergine series.   ALSO HAD SOME ITCHING WITH CODEINE. We will try the Percocet, as well encouraged to take Motrin or Advil. Patient will follow back otherwise in 2 weeks and given precautions.       Bhumika Avila MD       MLS / RN  D: 01/12/2018 14:16     T: 01/12/2018 15:00  JOB #: [id number]

## 2018-09-05 PROBLEM — T78.1XXA ALLERGIC REACTION TO FOOD: Status: RESOLVED | Noted: 2017-12-28 | Resolved: 2018-09-05

## 2018-09-06 PROBLEM — O99.281 HYPOTHYROID IN PREGNANCY, ANTEPARTUM, FIRST TRIMESTER: Status: ACTIVE | Noted: 2018-09-06

## 2018-09-06 PROBLEM — O09.891 HISTORY OF PRETERM DELIVERY, CURRENTLY PREGNANT, FIRST TRIMESTER: Status: ACTIVE | Noted: 2018-09-06

## 2018-09-06 PROBLEM — Z36.82 NUCHAL TRANSLUCENCY OF FETUS ON PRENATAL ULTRASOUND: Status: ACTIVE | Noted: 2018-09-06

## 2018-09-06 PROBLEM — E03.9 HYPOTHYROID IN PREGNANCY, ANTEPARTUM, FIRST TRIMESTER: Status: ACTIVE | Noted: 2018-09-06

## 2018-09-06 PROBLEM — O99.211 OBESITY AFFECTING PREGNANCY IN FIRST TRIMESTER: Status: ACTIVE | Noted: 2018-09-06

## 2018-09-11 ENCOUNTER — ANESTHESIA EVENT (OUTPATIENT)
Dept: LABOR AND DELIVERY | Age: 23
End: 2018-09-11
Payer: COMMERCIAL

## 2018-09-11 ENCOUNTER — HOSPITAL ENCOUNTER (OUTPATIENT)
Age: 23
LOS: 1 days | Discharge: HOME | End: 2018-09-11
Attending: OBSTETRICS & GYNECOLOGY | Admitting: OBSTETRICS & GYNECOLOGY
Payer: COMMERCIAL

## 2018-09-11 ENCOUNTER — ANESTHESIA (OUTPATIENT)
Dept: LABOR AND DELIVERY | Age: 23
End: 2018-09-11
Payer: COMMERCIAL

## 2018-09-11 VITALS
SYSTOLIC BLOOD PRESSURE: 100 MMHG | BODY MASS INDEX: 39.75 KG/M2 | TEMPERATURE: 97.5 F | RESPIRATION RATE: 18 BRPM | DIASTOLIC BLOOD PRESSURE: 55 MMHG | HEIGHT: 62 IN | WEIGHT: 216 LBS | OXYGEN SATURATION: 100 % | HEART RATE: 70 BPM

## 2018-09-11 DIAGNOSIS — Z36.82 NUCHAL TRANSLUCENCY OF FETUS ON PRENATAL ULTRASOUND: ICD-10-CM

## 2018-09-11 DIAGNOSIS — O09.891 HISTORY OF PRETERM DELIVERY, CURRENTLY PREGNANT, FIRST TRIMESTER: ICD-10-CM

## 2018-09-11 DIAGNOSIS — O34.31 CERVICAL INSUFFICIENCY DURING PREGNANCY IN FIRST TRIMESTER, ANTEPARTUM: ICD-10-CM

## 2018-09-11 DIAGNOSIS — O99.281 HYPOTHYROID IN PREGNANCY, ANTEPARTUM, FIRST TRIMESTER: ICD-10-CM

## 2018-09-11 DIAGNOSIS — E03.9 HYPOTHYROID IN PREGNANCY, ANTEPARTUM, FIRST TRIMESTER: ICD-10-CM

## 2018-09-11 DIAGNOSIS — O99.211 OBESITY AFFECTING PREGNANCY IN FIRST TRIMESTER: ICD-10-CM

## 2018-09-11 PROCEDURE — 76060000032 HC ANESTHESIA 0.5 TO 1 HR: Performed by: OBSTETRICS & GYNECOLOGY

## 2018-09-11 PROCEDURE — 74011258636 HC RX REV CODE- 258/636: Performed by: OBSTETRICS & GYNECOLOGY

## 2018-09-11 PROCEDURE — 74011000250 HC RX REV CODE- 250: Performed by: OBSTETRICS & GYNECOLOGY

## 2018-09-11 PROCEDURE — 77030020254 HC SOL INJ D5LR LACTATED RINGER

## 2018-09-11 PROCEDURE — 76210000064 HC RECOV POST SURG EA 0.5 HR: Performed by: OBSTETRICS & GYNECOLOGY

## 2018-09-11 PROCEDURE — 77030007880 HC KT SPN EPDRL BBMI -B: Performed by: ANESTHESIOLOGY

## 2018-09-11 PROCEDURE — 74011250637 HC RX REV CODE- 250/637: Performed by: OBSTETRICS & GYNECOLOGY

## 2018-09-11 PROCEDURE — 76010000389 HC LDRP PROCEDURE 0.5 TO 1 HR: Performed by: OBSTETRICS & GYNECOLOGY

## 2018-09-11 PROCEDURE — 74011250636 HC RX REV CODE- 250/636: Performed by: OBSTETRICS & GYNECOLOGY

## 2018-09-11 PROCEDURE — 74011000250 HC RX REV CODE- 250

## 2018-09-11 PROCEDURE — 77030003665 HC NDL SPN BBMI -A: Performed by: ANESTHESIOLOGY

## 2018-09-11 PROCEDURE — 77030002996 HC SUT SLK J&J -A: Performed by: OBSTETRICS & GYNECOLOGY

## 2018-09-11 PROCEDURE — 77030005537 HC CATH URETH BARD -A

## 2018-09-11 PROCEDURE — 77030018846 HC SOL IRR STRL H20 ICUM -A: Performed by: OBSTETRICS & GYNECOLOGY

## 2018-09-11 PROCEDURE — 77030002986 HC SUT PROL J&J -A: Performed by: OBSTETRICS & GYNECOLOGY

## 2018-09-11 PROCEDURE — 74011250636 HC RX REV CODE- 250/636: Performed by: ANESTHESIOLOGY

## 2018-09-11 PROCEDURE — 59320 REVISION OF CERVIX: CPT | Performed by: OBSTETRICS & GYNECOLOGY

## 2018-09-11 PROCEDURE — 74011250637 HC RX REV CODE- 250/637: Performed by: ANESTHESIOLOGY

## 2018-09-11 PROCEDURE — 74011250636 HC RX REV CODE- 250/636

## 2018-09-11 RX ORDER — ONDANSETRON 2 MG/ML
8 INJECTION INTRAMUSCULAR; INTRAVENOUS
Status: DISCONTINUED | OUTPATIENT
Start: 2018-09-11 | End: 2018-09-11 | Stop reason: HOSPADM

## 2018-09-11 RX ORDER — DEXTROSE, SODIUM CHLORIDE, SODIUM LACTATE, POTASSIUM CHLORIDE, AND CALCIUM CHLORIDE 5; .6; .31; .03; .02 G/100ML; G/100ML; G/100ML; G/100ML; G/100ML
100 INJECTION, SOLUTION INTRAVENOUS CONTINUOUS
Status: DISCONTINUED | OUTPATIENT
Start: 2018-09-11 | End: 2018-09-11 | Stop reason: HOSPADM

## 2018-09-11 RX ORDER — SODIUM CHLORIDE 0.9 % (FLUSH) 0.9 %
5-10 SYRINGE (ML) INJECTION EVERY 8 HOURS
Status: DISCONTINUED | OUTPATIENT
Start: 2018-09-11 | End: 2018-09-11 | Stop reason: HOSPADM

## 2018-09-11 RX ORDER — PHENAZOPYRIDINE HYDROCHLORIDE 95 MG/1
190 TABLET ORAL 3 TIMES DAILY
Qty: 12 TAB | Refills: 0 | Status: SHIPPED | OUTPATIENT
Start: 2018-09-11 | End: 2018-09-13

## 2018-09-11 RX ORDER — PHENAZOPYRIDINE HYDROCHLORIDE 95 MG/1
190 TABLET ORAL 3 TIMES DAILY
Status: DISCONTINUED | OUTPATIENT
Start: 2018-09-11 | End: 2018-09-11 | Stop reason: HOSPADM

## 2018-09-11 RX ORDER — ZOLPIDEM TARTRATE 5 MG/1
5 TABLET ORAL
Status: DISCONTINUED | OUTPATIENT
Start: 2018-09-11 | End: 2018-09-11 | Stop reason: HOSPADM

## 2018-09-11 RX ORDER — FAMOTIDINE 20 MG/1
20 TABLET, FILM COATED ORAL ONCE
Status: COMPLETED | OUTPATIENT
Start: 2018-09-11 | End: 2018-09-11

## 2018-09-11 RX ORDER — NALOXONE HYDROCHLORIDE 0.4 MG/ML
0.2 INJECTION, SOLUTION INTRAMUSCULAR; INTRAVENOUS; SUBCUTANEOUS AS NEEDED
Status: DISCONTINUED | OUTPATIENT
Start: 2018-09-11 | End: 2018-09-11 | Stop reason: HOSPADM

## 2018-09-11 RX ORDER — OXYCODONE AND ACETAMINOPHEN 10; 325 MG/1; MG/1
1 TABLET ORAL
Status: DISCONTINUED | OUTPATIENT
Start: 2018-09-11 | End: 2018-09-11 | Stop reason: HOSPADM

## 2018-09-11 RX ORDER — FAMOTIDINE 20 MG/1
20 TABLET, FILM COATED ORAL EVERY 12 HOURS
Status: DISCONTINUED | OUTPATIENT
Start: 2018-09-11 | End: 2018-09-11 | Stop reason: HOSPADM

## 2018-09-11 RX ORDER — ONDANSETRON 4 MG/1
8 TABLET, ORALLY DISINTEGRATING ORAL
Status: DISCONTINUED | OUTPATIENT
Start: 2018-09-11 | End: 2018-09-11 | Stop reason: HOSPADM

## 2018-09-11 RX ORDER — SODIUM CHLORIDE, SODIUM LACTATE, POTASSIUM CHLORIDE, CALCIUM CHLORIDE 600; 310; 30; 20 MG/100ML; MG/100ML; MG/100ML; MG/100ML
75 INJECTION, SOLUTION INTRAVENOUS CONTINUOUS
Status: DISCONTINUED | OUTPATIENT
Start: 2018-09-11 | End: 2018-09-11 | Stop reason: HOSPADM

## 2018-09-11 RX ORDER — BUPIVACAINE HYDROCHLORIDE 7.5 MG/ML
INJECTION, SOLUTION INTRASPINAL AS NEEDED
Status: DISCONTINUED | OUTPATIENT
Start: 2018-09-11 | End: 2018-09-11 | Stop reason: HOSPADM

## 2018-09-11 RX ORDER — SODIUM CHLORIDE 0.9 % (FLUSH) 0.9 %
5-10 SYRINGE (ML) INJECTION AS NEEDED
Status: DISCONTINUED | OUTPATIENT
Start: 2018-09-11 | End: 2018-09-11 | Stop reason: HOSPADM

## 2018-09-11 RX ORDER — ONDANSETRON 2 MG/ML
INJECTION INTRAMUSCULAR; INTRAVENOUS AS NEEDED
Status: DISCONTINUED | OUTPATIENT
Start: 2018-09-11 | End: 2018-09-11 | Stop reason: HOSPADM

## 2018-09-11 RX ADMIN — SODIUM CHLORIDE 12.5 MG: 9 INJECTION INTRAMUSCULAR; INTRAVENOUS; SUBCUTANEOUS at 11:41

## 2018-09-11 RX ADMIN — Medication 3 G: at 08:10

## 2018-09-11 RX ADMIN — SODIUM CHLORIDE, SODIUM LACTATE, POTASSIUM CHLORIDE, AND CALCIUM CHLORIDE: 600; 310; 30; 20 INJECTION, SOLUTION INTRAVENOUS at 08:40

## 2018-09-11 RX ADMIN — SODIUM CHLORIDE, SODIUM LACTATE, POTASSIUM CHLORIDE, AND CALCIUM CHLORIDE 1000 ML: 600; 310; 30; 20 INJECTION, SOLUTION INTRAVENOUS at 07:56

## 2018-09-11 RX ADMIN — MEPERIDINE HYDROCHLORIDE 50 MG: 50 INJECTION INTRAMUSCULAR; INTRAVENOUS; SUBCUTANEOUS at 11:41

## 2018-09-11 RX ADMIN — FAMOTIDINE 20 MG: 20 TABLET, FILM COATED ORAL at 08:10

## 2018-09-11 RX ADMIN — URINARY PAIN RELIEF 190 MG: 95 TABLET ORAL at 17:00

## 2018-09-11 RX ADMIN — OXYCODONE HYDROCHLORIDE AND ACETAMINOPHEN 1 TABLET: 10; 325 TABLET ORAL at 15:53

## 2018-09-11 RX ADMIN — MEPERIDINE HYDROCHLORIDE 50 MG: 50 INJECTION INTRAMUSCULAR; INTRAVENOUS; SUBCUTANEOUS at 14:09

## 2018-09-11 RX ADMIN — ONDANSETRON 8 MG: 4 TABLET, ORALLY DISINTEGRATING ORAL at 14:09

## 2018-09-11 RX ADMIN — ONDANSETRON 8 MG: 2 INJECTION INTRAMUSCULAR; INTRAVENOUS at 09:28

## 2018-09-11 RX ADMIN — SODIUM CHLORIDE, SODIUM LACTATE, POTASSIUM CHLORIDE, AND CALCIUM CHLORIDE 1000 ML: 600; 310; 30; 20 INJECTION, SOLUTION INTRAVENOUS at 08:20

## 2018-09-11 RX ADMIN — BUPIVACAINE HYDROCHLORIDE 1.6 ML: 7.5 INJECTION, SOLUTION INTRASPINAL at 08:53

## 2018-09-11 RX ADMIN — SODIUM CHLORIDE, SODIUM LACTATE, POTASSIUM CHLORIDE, CALCIUM CHLORIDE, AND DEXTROSE MONOHYDRATE 100 ML/HR: 600; 310; 30; 20; 5 INJECTION, SOLUTION INTRAVENOUS at 07:56

## 2018-09-11 NOTE — PROGRESS NOTES
Dr Lucretia Linares on phone report of pt in pain and nauseated. She is unable to void. Orders to give zofran 8 mg po, demerol 50 mg iv now. norco 7.5 x 1 q 4 hours.

## 2018-09-11 NOTE — ANESTHESIA PROCEDURE NOTES
Spinal Block Start time: 9/11/2018 8:48 AM 
End time: 9/11/2018 8:53 AM 
Performed by: Chilango Martin Authorized by: Chilango Martin Pre-procedure: Indications: primary anesthetic  Preanesthetic Checklist: patient identified, risks and benefits discussed, anesthesia consent, site marked, patient being monitored and timeout performed Timeout Time: 08:48 Spinal Block:  
Patient Position:  Seated Prep Region:  Lumbar Prep: chlorhexidine Location:  L3-4 Technique:  Single shot Local:  Lidocaine 1% (1 mL) Local Dose (mL):  1 Needle:  
Needle Type:  Pencan Needle Gauge:  25 G Attempts:  1 Events: CSF confirmed, no blood with aspiration and no paresthesia Assessment: 
Insertion:  Uncomplicated Patient tolerance:  Patient tolerated the procedure well with no immediate complications Anesthetic medications: 
Marcaine:  12 mg in 8.25% Dextrose

## 2018-09-11 NOTE — DISCHARGE INSTRUCTIONS
Cervical Cerclage to Prevent  Delivery: What to Expect at Home  Your Recovery  Cervical cerclage (say \"SER-vuh-kul ser-KLAZH\") is a procedure that helps keep your cervix from opening too soon. Your doctor has sewn your cervix shut to help prevent  labor. For the next few days, you may have:  · Cramping. · Spotting. · Pain when you urinate. Your doctor may give you instructions on when you can do your normal activities again, such as driving and going back to work. Your doctor will remove the stitches around your cervix at 37 weeks, or if you go into  labor or show signs of infection. This care sheet gives you a general idea about how long it will take for you to recover. But each person recovers at a different pace. Follow the steps below to get better as quickly as possible. How can you care for yourself at home? Activity    · Rest when you feel tired.     · Ask your doctor when it is okay for you to have sex. Medicines    · Be safe with medicines. Read and follow all instructions on the label.     · If you are not taking a prescription pain medicine, ask your doctor if you can take an over-the-counter medicine.     · Your doctor will tell you if and when you can restart your medicines. He or she will also give you instructions about taking any new medicines. Follow-up care is a key part of your treatment and safety. Be sure to make and go to all appointments, and call your doctor if you are having problems. It's also a good idea to know your test results and keep a list of the medicines you take. When should you call for help? Call 911 anytime you think you may need emergency care.  For example, call if:    · You have severe trouble breathing.     · You have sudden, severe pain in your belly.     · You have severe vaginal bleeding.    Call your doctor now or seek immediate medical care if:    · You have new pelvic pain, or the pain in your pelvis gets worse.     · You have a new discharge from your vagina.     · You have a new or higher fever.    Watch closely for changes in your health, and be sure to contact your doctor if you have any problems. Where can you learn more? Go to http://micaela-dariel.info/. Enter W307 in the search box to learn more about \"Cervical Cerclage to Prevent  Delivery: What to Expect at Home. \"  Current as of: 2017  Content Version: 11.7  © 3943-3468 [employer] Care instructions adapted under license by [employer] (which disclaims liability or warranty for this information). If you have questions about a medical condition or this instruction, always ask your healthcare professional. [address] any warranty or liability for your use of this information. High-Risk Pregnancy: Care Instructions  Your Care Instructions    Your pregnancy is high-risk if you or your baby has an increased risk for health problems. Many things can put you at high risk. But being at high risk does not mean that you or your baby will have a problem. Your pregnancy is high-risk if:  · You have a previous condition, such as diabetes. · Your baby has been found to have a problem, such as Down syndrome. · You have a problem during pregnancy, such as preeclampsia, or problems with the placenta-the organ that gives food and oxygen to the baby. · You had a problem in a past pregnancy. · You are younger than 16 or older than 28. · You are pregnant with twins or more. Your doctor will watch you closely to make sure that you and your baby are doing well. You may have ultrasounds or other tests to check your baby's growth. In some cases, you may have to rest at home (or in the hospital) until your baby is old enough to be born safely. If your doctor thinks that your health or your baby's health is at risk, you may have an early delivery.   Follow-up care is a key part of your treatment and safety. Be sure to make and go to all appointments, and call your doctor if you are having problems. It's also a good idea to know your test results and keep a list of the medicines you take. How can you care for yourself at home? · Go to all your prenatal visits. You will have tests for high blood pressure and for protein in your urine (both are signs of preeclampsia). Your doctor also will make sure that your baby is growing properly. · Follow your doctor's directions for activity. You may have to stop working, reduce your activity, or spend a lot of time resting (partial bed rest). You may need to do daily checks of your baby's heartbeat and your weight. If you are on bed rest:  ¨ Keep a phone, phone book, notepad, and pen near the bed where you can easily reach them. ¨ Gently stretch your legs every hour to keep good blood circulation. ¨ Do quiet activities such as reading, craft projects, or writing letters. · If you are told to take medicine, such as medicine for high blood pressure, take your medicine exactly as prescribed. Call your doctor if you have any problems with your medicine. · Follow your doctor's advice for diet and other tips for a healthy pregnancy. Rest when you need it, eat well, and drink plenty of water. If you are not on partial bed rest, do mild exercise (such as walking) if your doctor says it is okay. · Do not smoke. Smoking can harm your baby's growth and health. If you need help quitting, talk to your doctor about stop-smoking programs and medicines. These can increase your chances of quitting for good. · Do not drink alcohol. Alcohol can cause problems in the growing baby. · Avoid tobacco smoke, alcohol and drugs, chemicals, and radiation (such as from X-rays). Stay away from people who have colds and other infections. Where can you learn more? Go to http://micaela-dariel.info/.   Gia Lies in the search box to learn more about \"High-Risk Pregnancy: Care Instructions. \"  Current as of: November 21, 2017  Content Version: 11.7  © 4610-7732 [employer], [facility]. Care instructions adapted under license by [employer] (which disclaims liability or warranty for this information). If you have questions about a medical condition or this instruction, always ask your healthcare professional. [place] [address] any warranty or liability for your use of this information.

## 2018-09-11 NOTE — OP NOTES
[facility] Cervical Cerclage Procedure Note  Pre-operative Diagnosis: Cervical Incompetence  Post-operative Diagnosis: Cervical Incompetence  Surgeon:  Renetta Bonilla MD   Anesthesia: Spinal  Procedure: Procedure(s):  CERCLAGE  Indications:  Rachael Cogan is a  female with a clinical history consistent with cervical incompetence. Cervical Cerclage was offered for treatment of cervical incompetence and the risks were explained in detail in the office and again in the hospital prior to surgery. These included risk of infection, bleeding, bladder and bowel damage and pregnancy loss, along with rupture of membranes now or later in pregnancy. These complications were all explained in detail and questions answered. It was explained to the patient that she had the option of expectant management without Cerclage placement. She understood that her care and treatment would not be affected by her choice and there was no pressure on her to choose this course of treatment. After a long discussion and clear understanding by the patient, the patient consented to the procedure prior to coming to the hospital and, again on admission to the hospital.   Procedure Details:   Rachael Cogan was taken to the Operating Room where spinal anesthetic was administered. The patient was then placed in the dorsal lithotomy position. The vulva and distal vagina were then gently prepped with Betadine solution and draped in a sterile fashion to expose the vaginal introitus. The patient was then placed in Trendelenburg position to allow better visualization of the vaginal canal and the cervix. An I and O  catheter was used to drain the urine from the bladder. Using retractors, the cervix and distal vagina were visualized. The vaginal portion of the cervical length was normal.  The external cervical os appeared to be open but the internal os was closed to manual exam. Fetal membranes were not visualized.   The cervix and distal vagina were again gently washed carefully with Betadine solution and dried with sterile sponges. A long atraumatic Allis clamp was used to retract the cervix by grasping a significant portio of the cervix, carefully placed to avoid membranes at the 10 o'clock position. Using 5mm Mersaline, the first bite of the Johnston stitch was placed at the 12 o'clock position on the cervix at the junction of the vaginal mucosa and the portio of the cervix with the suture placed through the cervical stroma, careful to avoid cervical canal and amniotic sac, between mucosa and cervical canal.  This procedure was repeated in 5  bites in a purse string fashion with sequential grasping and releasing of the cervix with the Allis clamp to retract the cervical stroma and allow for placement of suture at the junction of the vaginal mucosa and the portio of the cervix. Care was used in grasping the cervix to be atraumatic and to cause as little of trauma and bleeding as possible. The last stitch ended at approximately the 12 o'clock again, needle cut and both sutures were gently retracted to take up any slack in the suture and a finger placed in the cervix and retraction of both ends of suture and internal os noted to be closed with suture tension. The suture was evaluated visually in all quadrants and felt to be at the junction of the vaginal mucosa and the portio of the cervix without including any sidewall and without including bladder or bowel. The suture was then tied with 7 square knots. The cervix was checked again and visualized and the cervix remained pink with no significantly bleeding, internal os palpated and was tightly closed. A 2-0 silk suture was place through both ends of suture approximately 1 cm distal to knots ant then tied.   An air knot, approximately two cm in length was placed with three knots at the end of the air knot to allow grasping of the suture at the time of removal.   Cervix and vagina were again evaluated and no bleeding was noted. The cervix remained pink. Instruments and retractors were removed. At the end of the procedure, sponge, instrument and needle counts were noted to be correct. Estimated Blood Loss:  Minimal  Suture Type: 5mm Mersaline  Number of Sutures: 1  Findings:  Normal cervix with multiparous os, large ectropion. Cerclage placed without complication.   Signed By: Chana Tucker MD 9/11/2018

## 2018-09-11 NOTE — PROGRESS NOTES
Dr Jay Mann on phone report of pt feeling much better now and wants to go home . Orders to discharge with his cell phone. He will call in her meds.

## 2018-09-11 NOTE — IP AVS SNAPSHOT
Naz Gallagher 
 
 
 [address] [place] [address] 
[phone] Patient: Memo Álvarez MRN: [id number] :1995 About your hospitalization You were admitted on:  2018 You last received care in the:  [facility] ANTEPARTUM You were discharged on:  2018 Why you were hospitalized Your primary diagnosis was:  Not on File Your diagnoses also included:  Cervical Insufficiency During Pregnancy In First Trimester, Antepartum Follow-up Information None Your Scheduled Appointments 2018  8:00 AM EDT ANATOMY LIMITED EARLY with [facility] ULTRASOUND 2  
[facility] MATERNAL FETAL MEDICINE ([address] [place] [address]) [address] [place] [id number]  
[phone] 2018  9:00 AM EDT  
OB VISIT with Sukhi Yang MD  
[address] [place] [address] ([address] [place] [address]) [address] [place] [id number] [phone] Discharge Orders None A check rosalina indicates which time of day the medication should be taken. My Medications ASK your doctor about these medications Instructions Each Dose to Equal  
 Morning Noon Evening Bedtime  
 aspirin 81 mg chewable tablet Your last dose was: Your next dose is: Take 81 mg by mouth daily. 81 mg MONISTAT 7 2 % vaginal cream  
Generic drug:  miconazole Your last dose was: Your next dose is: Insert 1 Applicator into vagina nightly. 1 Applicator PRENATAL #2 PO Your last dose was: Your next dose is: Take  by mouth. synthroid 50 mcg tablet Generic drug:  levothyroxine Your last dose was: Your next dose is: Take  by mouth Daily (before breakfast). Discharge Instructions Cervical Cerclage to Prevent  Delivery: What to Expect at [facility] Your Recovery Cervical cerclage (say \"SER-vuh-kul ser-KLAZH\") is a procedure that helps keep your cervix from opening too soon. Your doctor has sewn your cervix shut to help prevent  labor. For the next few days, you may have: · Cramping. · Spotting. · Pain when you urinate. Your doctor may give you instructions on when you can do your normal activities again, such as driving and going back to work. Your doctor will remove the stitches around your cervix at 37 weeks, or if you go into  labor or show signs of infection. This care sheet gives you a general idea about how long it will take for you to recover. But each person recovers at a different pace. Follow the steps below to get better as quickly as possible. How can you care for yourself at home? Activity 
  · Rest when you feel tired.  
  · Ask your doctor when it is okay for you to have sex. Medicines 
  · Be safe with medicines. Read and follow all instructions on the label.  
  · If you are not taking a prescription pain medicine, ask your doctor if you can take an over-the-counter medicine.  
  · Your doctor will tell you if and when you can restart your medicines. He or she will also give you instructions about taking any new medicines. Follow-up care is a key part of your treatment and safety. Be sure to make and go to all appointments, and call your doctor if you are having problems. It's also a good idea to know your test results and keep a list of the medicines you take. When should you call for help? Call 911 anytime you think you may need emergency care. For example, call if: 
  · You have severe trouble breathing.  
  · You have sudden, severe pain in your belly.  
  · You have severe vaginal bleeding.  
 Call your doctor now or seek immediate medical care if:   · You have new pelvic pain, or the pain in your pelvis gets worse.  
  · You have a new discharge from your vagina.  
  · You have a new or higher fever.  
 Watch closely for changes in your health, and be sure to contact your doctor if you have any problems. Where can you learn more? Go to http://micaela-dariel.info/. Enter D681 in the search box to learn more about \"Cervical Cerclage to Prevent  Delivery: What to Expect at Home. \" Current as of: 2017 Content Version: 11.7 © 2696-5944 [employer]. Care instructions adapted under license by [employer] (which disclaims liability or warranty for this information). If you have questions about a medical condition or this instruction, always ask your healthcare professional. [address] any warranty or liability for your use of this information. High-Risk Pregnancy: Care Instructions Your Care Instructions Your pregnancy is high-risk if you or your baby has an increased risk for health problems. Many things can put you at high risk. But being at high risk does not mean that you or your baby will have a problem. Your pregnancy is high-risk if: 
· You have a previous condition, such as diabetes. · Your baby has been found to have a problem, such as Down syndrome. · You have a problem during pregnancy, such as preeclampsia, or problems with the placenta-the organ that gives food and oxygen to the baby. · You had a problem in a past pregnancy. · You are younger than 16 or older than 28. · You are pregnant with twins or more. Your doctor will watch you closely to make sure that you and your baby are doing well. You may have ultrasounds or other tests to check your baby's growth. In some cases, you may have to rest at home (or in the hospital) until your baby is old enough to be born safely.  If your doctor thinks that your health or your baby's health is at risk, you may have an early delivery. Follow-up care is a key part of your treatment and safety. Be sure to make and go to all appointments, and call your doctor if you are having problems. It's also a good idea to know your test results and keep a list of the medicines you take. How can you care for yourself at home? · Go to all your prenatal visits. You will have tests for high blood pressure and for protein in your urine (both are signs of preeclampsia). Your doctor also will make sure that your baby is growing properly. · Follow your doctor's directions for activity. You may have to stop working, reduce your activity, or spend a lot of time resting (partial bed rest). You may need to do daily checks of your baby's heartbeat and your weight. If you are on bed rest: 
¨ Keep a phone, phone book, notepad, and pen near the bed where you can easily reach them. ¨ Gently stretch your legs every hour to keep good blood circulation. ¨ Do quiet activities such as reading, craft projects, or writing letters. · If you are told to take medicine, such as medicine for high blood pressure, take your medicine exactly as prescribed. Call your doctor if you have any problems with your medicine. · Follow your doctor's advice for diet and other tips for a healthy pregnancy. Rest when you need it, eat well, and drink plenty of water. If you are not on partial bed rest, do mild exercise (such as walking) if your doctor says it is okay. · Do not smoke. Smoking can harm your baby's growth and health. If you need help quitting, talk to your doctor about stop-smoking programs and medicines. These can increase your chances of quitting for good. · Do not drink alcohol. Alcohol can cause problems in the growing baby. · Avoid tobacco smoke, alcohol and drugs, chemicals, and radiation (such as from X-rays). Stay away from people who have colds and other infections. Where can you learn more? Go to http://micaela-dariel.info/. Julio C Polk in the search box to learn more about \"High-Risk Pregnancy: Care Instructions. \" Current as of: November 21, 2017 Content Version: 11.7 © 6957-3868 [employer]. Care instructions adapted under license by [employer] (which disclaims liability or warranty for this information). If you have questions about a medical condition or this instruction, always ask your healthcare professional. [address] any warranty or liability for your use of this information. Introducing [facility] & HEALTH SERVICES! Dear Justin Lopez: 
Thank you for requesting a "[employer] account. Our records indicate that you already have an active "[employer] account. You can access your account anytime at [email or web address] Did you know that you can access your hospital and ER discharge instructions at any time in "[employer]? You can also review all of your test results from your hospital stay or ER visit. Additional Information If you have questions, please visit the Frequently Asked Questions section of the "[employer] website at [email or web address] Remember, "[employer] is NOT to be used for urgent needs. For medical emergencies, dial 911. Now available from your iPhone and Android! Introducing Saw Boone As a [employer] patient, I wanted to make you aware of our electronic visit tool called Saw Boone. [employer] 24/7 allows you to connect within minutes with a medical provider 24 hours a day, seven days a week via a mobile device or tablet or logging into a secure website from your computer. You can access Saw Boone from anywhere in the [place].  
 
A virtual visit might be right for you when you have a simple condition and feel like you just dont want to get out of bed, or cant get away from work for an appointment, when your regular [facility] Mcneal provider is not available (evenings, weekends or holidays), or when youre out of town and need minor care. Electronic visits cost only $49 and if the [facility] Mcne[facility] 24/7 provider determines a prescription is needed to treat your condition, one can be electronically transmitted to a nearby pharmacy*. Please take a moment to enroll today if you have not already done so. The enrollment process is free and takes just a few minutes. To enroll, please download the Katharine Fredis 24/7 jesus to your tablet or phone, or visit [email or web address] to enroll on your computer. And, as an [address] [place] [address] patient with a [employer] account, the results of your visits will be scanned into your electronic medical record and your primary care provider will be able to view the scanned results. We urge you to continue to see your regular Katharine Mcneal provider for your ongoing medical care. And while your primary care provider may not be the one available when you seek a Saw Boone virtual visit, the peace of mind you get from getting a real diagnosis real time can be priceless. For more information on Saw Notifixiousleónfin, view our Frequently Asked Questions (FAQs) at [email or web address]. Sincerely, 
 
Christophe Pantoja MD 
Chief Medical Officer [address] *:  certain medications cannot be prescribed via Saw Boone Providers Seen During Your Hospitalization Provider Specialty Primary office phone Kirk Jimneez MD Maternal . Fetal Medicine [phone] Your Primary Care Physician (PCP) Primary Care Physician Office Phone Office Fax [address] [facility], [address] You are allergic to the following Allergen Reactions Morphine Anaphylaxis Shellfish Containing Products Shortness of Breath Shellfish Derived Hives Swelling Codeine Hives Swelling Doxycycline Hives Nausea and Vomiting Swelling Lortab (Hydrocodone-Acetaminophen) Hives Nausea and Vomiting Pcn (Penicillins) Hives Swelling Strawberry Hives Nausea and Vomiting Tramadol Hives Zithromax (Azithromycin) Hives Swelling Recent Documentation Height Weight Breastfeeding? BMI OB Status Smoking Status 1.575 m 98 kg No 39.51 kg/m2 Pregnant Never Smoker Emergency Contacts Name Discharge Info Relation Home Work Mobile Nba Escobar  Spouse [3] [phone] Patient Belongings The following personal items are in your possession at time of discharge: 
  Dental Appliances: None  Visual Aid: Glasses      Home Medications: None   Jewelry: Ring  Clothing: At bedside, Pants, Footwear, Shirt    Other Valuables: At bedside, Cell Phone Please provide this summary of care documentation to your next provider. Signatures-by signing, you are acknowledging that this After Visit Summary has been reviewed with you and you have received a copy. Patient Signature:  ____________________________________________________________ Date:  ____________________________________________________________  
  
Juan Carlos Hedrick Provider Signature:  ____________________________________________________________ Date:  ____________________________________________________________

## 2018-09-11 NOTE — PROGRESS NOTES
09/11/18 8869 Peripheral IV 09/11/18 Right Forearm Placement Date/Time: 09/11/18 0755   Number of Attempts: 3  Inserted By: Sathish Felton  Present on Admission/Arrival: No  Size: 20 G  Orientation: Right  Location: Forearm Site Assessment Clean, dry, & intact Phlebitis Assessment 0 Infiltration Assessment 0 Dressing Status Clean, dry, & intact Dressing Type Tape;Transparent Hub Color/Line Status Pink; Infusing

## 2018-09-11 NOTE — PROGRESS NOTES
Dr Christiano Enamorado on phone report of pt in a great deal of pain. Percocet 10  Given 1 hour ago and demoerol 50 mg given 2 hours ago. Pt got up to void and was successful but she is now in more pain. Peridium plus now. Call back in 1 hour if not better. Will come assess.

## 2018-09-11 NOTE — PROGRESS NOTES
Pt admitted to Room 439 for scheduled cerclage placement. Admission assessment completed. Discussed plan of care with patient.

## 2018-09-11 NOTE — IP AVS SNAPSHOT
[address] [facility] 
[phone] Patient: Manisha Tobar MRN: [id number] :1995 A check rosalina indicates which time of day the medication should be taken. My Medications ASK your doctor about these medications Instructions Each Dose to Equal  
 Morning Noon Evening Bedtime  
 aspirin 81 mg chewable tablet Your last dose was: Your next dose is: Take 81 mg by mouth daily. 81 mg MONISTAT 7 2 % vaginal cream  
Generic drug:  miconazole Your last dose was: Your next dose is: Insert 1 Applicator into vagina nightly. 1 Applicator PRENATAL #2 PO Your last dose was: Your next dose is: Take  by mouth. synthroid 50 mcg tablet Generic drug:  levothyroxine Your last dose was: Your next dose is: Take  by mouth Daily (before breakfast).

## 2018-09-11 NOTE — PROGRESS NOTES
Pt states her back hurts. But she feels better she has not voided again. She will get oob to void soon and report if she feels better.

## 2018-09-11 NOTE — ANESTHESIA PREPROCEDURE EVALUATION
Anesthetic History No history of anesthetic complications Review of Systems / Medical History Patient summary reviewed, nursing notes reviewed and pertinent labs reviewed Pulmonary Asthma : well controlled Neuro/Psych Within defined limits Cardiovascular Exercise tolerance: >4 METS 
  
GI/Hepatic/Renal 
  
 
 
 
 
 
 Endo/Other Hypothyroidism: well controlled Other Findings Physical Exam 
 
Airway Mallampati: II 
TM Distance: 4 - 6 cm Neck ROM: normal range of motion Mouth opening: Normal 
 
 Cardiovascular Rhythm: regular Dental 
No notable dental hx Pulmonary Breath sounds clear to auscultation Abdominal 
GI exam deferred Other Findings Anesthetic Plan ASA: 2 Anesthesia type: spinal 
 
 
 
 
Induction: Intravenous Anesthetic plan and risks discussed with: Patient

## 2018-09-11 NOTE — PROGRESS NOTES
Patient back in room 439, recovery started  
0928-Zofran 8 mg given per CRNA. Order received per Dr Richard Gonzalez

## 2018-09-11 NOTE — ANESTHESIA POSTPROCEDURE EVALUATION
Post-Anesthesia Evaluation and Assessment Patient: Nitish Lo MRN: [id number]  SSN: [id number] YOB: 1995  Age: 24 y.o. Sex: female Cardiovascular Function/Vital Signs Visit Vitals  /46  Pulse 76  Temp 36.4 °C (97.5 °F)  Resp 16  
 Ht 5' 2\" (1.575 m)  Wt 98 kg (216 lb)  SpO2 99%  Breastfeeding No  
 BMI 39.51 kg/m2 Patient is status post spinal anesthesia for Procedure(s): CERCLAGE. Nausea/Vomiting: None Postoperative hydration reviewed and adequate. Pain: 
Pain Scale 1: Visual (09/11/18 1842) Pain Intensity 1: 0 (09/11/18 0934) Managed Neurological Status:  
Neuro (WDL): Exceptions to [facility] (09/11/18 0934) Neuro Neurologic State: Alert (09/11/18 1842) Orientation Level: Oriented X4 (09/11/18 0934) Cognition: Appropriate decision making; Appropriate for age attention/concentration; Appropriate safety awareness (09/11/18 0934) Speech: Clear (09/11/18 0934) LUE Motor Response: Purposeful (09/11/18 0934) LLE Motor Response: Numbness; Pharmacologically paralyzed (09/11/18 0934) RUE Motor Response: Purposeful (09/11/18 0934) RLE Motor Response: Numbness; Pharmacologically paralyzed (09/11/18 0934) At baseline Mental Status and Level of Consciousness: Arousable Pulmonary Status:  
O2 Device: Room air (09/11/18 0934) Adequate oxygenation and airway patent Complications related to anesthesia: None Post-anesthesia assessment completed. No concerns Signed By: Aj Lew MD   
 September 11, 2018

## 2018-10-08 PROBLEM — O99.212 OBESITY AFFECTING PREGNANCY IN SECOND TRIMESTER: Status: ACTIVE | Noted: 2018-09-06

## 2018-10-08 PROBLEM — O35.EXX0 FETAL HYDRONEPHROSIS IN PREGNANCY, ANTEPARTUM CONDITION: Status: ACTIVE | Noted: 2018-10-08

## 2018-10-08 PROBLEM — O99.282 HYPOTHYROIDISM COMPLICATING PREGNANCY, SECOND TRIMESTER: Status: ACTIVE | Noted: 2018-09-06

## 2018-10-08 PROBLEM — O34.32 CERVICAL INSUFFICIENCY DURING PREGNANCY IN SECOND TRIMESTER, ANTEPARTUM: Status: ACTIVE | Noted: 2018-09-11

## 2018-10-08 PROBLEM — O44.22 PARTIAL PLACENTA PREVIA WITHOUT HEMORRHAGE DURING PREGNANCY IN SECOND TRIMESTER: Status: ACTIVE | Noted: 2018-10-08

## 2018-10-08 PROBLEM — O09.892 HISTORY OF PRETERM DELIVERY, CURRENTLY PREGNANT, SECOND TRIMESTER: Status: ACTIVE | Noted: 2018-09-06

## 2018-11-03 ENCOUNTER — HOSPITAL ENCOUNTER (INPATIENT)
Age: 23
LOS: 1 days | Discharge: HOME | DRG: 833 | End: 2018-11-03
Attending: OBSTETRICS & GYNECOLOGY | Admitting: OBSTETRICS & GYNECOLOGY
Payer: COMMERCIAL

## 2018-11-03 VITALS
SYSTOLIC BLOOD PRESSURE: 106 MMHG | DIASTOLIC BLOOD PRESSURE: 66 MMHG | HEIGHT: 62 IN | WEIGHT: 221 LBS | HEART RATE: 74 BPM | BODY MASS INDEX: 40.67 KG/M2 | TEMPERATURE: 97.8 F | RESPIRATION RATE: 20 BRPM

## 2018-11-03 PROBLEM — R10.2 PELVIC CRAMPING IN ANTEPARTUM PERIOD: Status: ACTIVE | Noted: 2018-11-03

## 2018-11-03 PROBLEM — O26.899 PELVIC CRAMPING IN ANTEPARTUM PERIOD: Status: ACTIVE | Noted: 2018-11-03

## 2018-11-03 LAB
GLUCOSE, GLUUPC: NEGATIVE
KETONES UR-MCNC: NEGATIVE MG/DL
PROT UR QL: NEGATIVE

## 2018-11-03 PROCEDURE — 99283 EMERGENCY DEPT VISIT LOW MDM: CPT

## 2018-11-03 PROCEDURE — 76815 OB US LIMITED FETUS(S): CPT

## 2018-11-03 PROCEDURE — 65270000029 HC RM PRIVATE

## 2018-11-03 PROCEDURE — 81002 URINALYSIS NONAUTO W/O SCOPE: CPT | Performed by: OBSTETRICS & GYNECOLOGY

## 2018-11-03 NOTE — PROGRESS NOTES
Pt d/c'd home in stable condition with spouse. Written and verbal d/c instructions given with pregnancy and  labor precautions. Pt states understanding, all questions answered. Pt to f/u as scheduled in office this week.

## 2018-11-03 NOTE — PROGRESS NOTES
Pt to triage room TONJA 02 with c/o intermittent lower abdominal cramping that is about every 15 minutes and radiates to her back. Pt states she tried to have BM and when the pain comes she has rectal pressure. When on the toilet patient states it made her nauseated. Pt states pain and pressure only last about 15-20 seconds. Pt denies any LOF or VB. FHTs obtained in the 140s. Triage database and assessment completed. SVE deferred. Pt states she has a cerclage that was placed @ 13 weeks gestation. Triage process explained to pt. Pt instructed on call light and placed within reach. Verbalized understanding to all teaching.   This RN will notify Dr Fermin Dougherty of pt arrival, gest age, hx, complaint etc.

## 2018-11-03 NOTE — PROGRESS NOTES
D/C order received from Dr Keturah Mercedes. Cervical length is over 4cm per MD based on ultrasound.

## 2018-11-03 NOTE — DISCHARGE INSTRUCTIONS
Follow up as scheduled in office.  Labor: Care Instructions  Your Care Instructions     labor is the start of labor between 21 and 36 weeks of pregnancy. A full-term pregnancy lasts 37 to 42 weeks. In labor, the uterus contracts to open the cervix. This is the first stage of childbirth.  labor can be caused by a problem with the baby, the mother, or both. Often the cause is not known. In some cases, doctors use medicines to try to delay labor until 29 or more weeks of pregnancy. By this time, a baby has grown enough so that problems are not likely. In some cases--such as with a serious infection--it is healthier for the baby to be born early. Your treatment will depend on how far along you are in your pregnancy and on your health and your baby's health. Follow-up care is a key part of your treatment and safety. Be sure to make and go to all appointments, and call your doctor if you are having problems. It's also a good idea to know your test results and keep a list of the medicines you take. How can you care for yourself at home? · If your doctor prescribed medicines, take them exactly as directed. Call your doctor if you think you are having a problem with your medicine. · Rest until your doctor advises you about activity. He or she will tell you if you should stay in bed most of the time. You may need to arrange for  if you have young children. · Do not have sexual intercourse unless your doctor says it is safe. · Use pads, not tampons, if you have vaginal bleeding. · Make sure to drink plenty of fluids. Dehydration can lead to contractions. If you have kidney, heart, or liver disease and have to limit fluids, talk with your doctor before you increase the amount of fluids you drink. · Do not smoke or allow others to smoke around you. If you need help quitting, talk to your doctor about stop-smoking programs and medicines.  These can increase your chances of quitting for good.  When should you call for help? Call 911 anytime you think you may need emergency care. For example, call if:    · You passed out (lost consciousness).     · You have severe vaginal bleeding.     · You have severe pain in your belly or pelvis.     · You have had fluid gushing or leaking from your vagina and you know or think the umbilical cord is bulging into your vagina. If this happens, immediately get down on your knees so your rear end (buttocks) is higher than your head. This will decrease the pressure on the cord until help arrives.   [facility] your doctor now or seek immediate medical care if:    · You have signs of preeclampsia, such as:  ? Sudden swelling of your face, hands, or feet. ? New vision problems (such as dimness or blurring). ? A severe headache.     · You have any vaginal bleeding.     · You have belly pain or cramping.     · You have a fever.     · You have had regular contractions (with or without pain) for an hour. This means that you have 6 or more within 1 hour after you change your position and drink fluids.     · You have a sudden release of fluid from the vagina.     · You have low back pain or pelvic pressure that does not go away.     · You notice that your baby has stopped moving or is moving much less than normal.    Watch closely for changes in your health, and be sure to contact your doctor if you have any problems. Where can you learn more? Go to http://micaela-dariel.info/. Enter Q400 in the search box to learn more about \" Labor: Care Instructions. \"  Current as of: 2017  Content Version: 11.8  © 1256-7345 [employer]. Care instructions adapted under license by [employer] (which disclaims liability or warranty for this information).  If you have questions about a medical condition or this instruction, always ask your healthcare professional. [place] [address] any warranty or liability for your use of this information. Pregnancy Precautions: Care Instructions  Your Care Instructions    There is no sure way to prevent labor before your due date ( labor) or to prevent most other pregnancy problems. But there are things you can do to increase your chances of a healthy pregnancy. Go to your appointments, follow your doctor's advice, and take good care of yourself. Eat well, and exercise (if your doctor agrees). And make sure to drink plenty of water. Follow-up care is a key part of your treatment and safety. Be sure to make and go to all appointments, and call your doctor if you are having problems. It's also a good idea to know your test results and keep a list of the medicines you take. How can you care for yourself at home? · Make sure you go to your prenatal appointments. At each visit, your doctor will check your blood pressure. Your doctor will also check to see if you have protein in your urine. High blood pressure and protein in urine are signs of preeclampsia. This condition can be dangerous for you and your baby. · Drink plenty of fluids, enough so that your urine is light yellow or clear like water. Dehydration can cause contractions. If you have kidney, heart, or liver disease and have to limit fluids, talk with your doctor before you increase the amount of fluids you drink. · Tell your doctor right away if you notice any symptoms of an infection, such as:  ? Burning when you urinate. ? A foul-smelling discharge from your vagina. ? Vaginal itching. ? Unexplained fever. ? Unusual pain or soreness in your uterus or lower belly. · Eat a balanced diet. Include plenty of foods that are high in calcium and iron. ? Foods high in calcium include milk, cheese, yogurt, almonds, and broccoli. ? Foods high in iron include red meat, shellfish, poultry, eggs, beans, raisins, whole-grain bread, and leafy green vegetables. · Do not smoke.  If you need help quitting, talk to your doctor about stop-smoking programs and medicines. These can increase your chances of quitting for good. · Do not drink alcohol or use illegal drugs. · Follow your doctor's directions about activity. Your doctor will let you know how much, if any, exercise you can do. · Ask your doctor if you can have sex. If you are at risk for early labor, your doctor may ask you to not have sex. · Take care to prevent falls. During pregnancy, your joints are loose, and your balance is off. Sports such as bicycling, skiing, or in-line skating can increase your risk of falling. And don't ride horses or motorcycles, dive, water ski, scuba dive, or parachute jump while you are pregnant. · Avoid getting very hot. Do not use saunas or hot tubs. Avoid staying out in the sun in hot weather for long periods. Take acetaminophen (Tylenol) to lower a high fever. · Do not take any over-the-counter or herbal medicines or supplements without talking to your doctor or pharmacist first.  When should you call for help? Call 911 anytime you think you may need emergency care. For example, call if:    · You passed out (lost consciousness).     · You have severe vaginal bleeding.     · You have severe pain in your belly or pelvis.     · You have had fluid gushing or leaking from your vagina and you know or think the umbilical cord is bulging into your vagina. If this happens, immediately get down on your knees so your rear end (buttocks) is higher than your head. This will decrease the pressure on the cord until help arrives.   [facility] your doctor now or seek immediate medical care if:    · You have signs of preeclampsia, such as:  ? Sudden swelling of your face, hands, or feet. ? New vision problems (such as dimness or blurring). ? A severe headache.     · You have any vaginal bleeding.     · You have belly pain or cramping.     · You have a fever.     · You have had regular contractions (with or without pain) for an hour.  This means that you have 8 or more within 1 hour or 4 or more in 20 minutes after you change your position and drink fluids.     · You have a sudden release of fluid from your vagina.     · You have low back pain or pelvic pressure that does not go away.     · You notice that your baby has stopped moving or is moving much less than normal.    Watch closely for changes in your health, and be sure to contact your doctor if you have any problems. Where can you learn more? Go to http://micaela-dariel.info/. Enter [id number] in the search box to learn more about \"Pregnancy Precautions: Care Instructions. \"  Current as of: November 21, 2017  Content Version: 11.8  © 7704-4659 [employer]. Care instructions adapted under license by [employer] (which disclaims liability or warranty for this information). If you have questions about a medical condition or this instruction, always ask your healthcare professional. [address] any warranty or liability for your use of this information.

## 2018-11-03 NOTE — ED PROVIDER NOTES
Chief Complaint: pelvic cramping 
 
 
24 y.o. female at 20w3d 
weeks gestation who is seen for several weeks of pelvic cramping. Pt had a prophylactic cerclage placed by MFM at 13 weeks EGA. She notes good FM. Pt denies VB, LOF, UTI or preeclamptic symptoms. HISTORY: 
 
Social History Substance and Sexual Activity Sexual Activity Yes  Partners: Male  Birth control/protection: Condom Patient's last menstrual period was 06/13/2018 (exact date). Social History Socioeconomic History  Marital status:  Spouse name: Not on file  Number of children: Not on file  Years of education: Not on file  Highest education level: Not on file Social Needs  Financial resource strain: Not on file  Food insecurity - worry: Not on file  Food insecurity - inability: Not on file  Transportation needs - medical: Not on file  Transportation needs - non-medical: Not on file Occupational History  Not on file Tobacco Use  Smoking status: Never Smoker  Smokeless tobacco: Never Used Substance and Sexual Activity  Alcohol use: No  
  Alcohol/week: 0.0 oz  Drug use: No  
 Sexual activity: Yes  
  Partners: Male Birth control/protection: Condom Other Topics Concern  Not on file Social History Narrative  Not on file Past Surgical History:  
Procedure Laterality Date  HX KNEE ARTHROSCOPY Left MCL; PCL  
 HX KNEE ARTHROSCOPY Right 1/5/15 MCL; PCL, filled micro fractures  HX PELVIC LAPAROSCOPY Right 5/6/2015  
 right ovarian cystectomy,chromopertubation,pelvic washings  HX WISDOM TEETH EXTRACTION Past Medical History:  
Diagnosis Date  Allergic reaction to food 12/28/2017  Anemia  Asthma   
 proair as needed; uses rarely  Broken bones \"lots\" falls often  Fetal hydronephrosis in pregnancy, antepartum condition 10/8/2018  Hip dysplasia   
 bilateral and knee dysplasia  Hypothyroid in pregnancy, antepartum, first trimester 9/6/2018  Obesity affecting pregnancy in first trimester 9/6/2018  Ovarian cyst   
 cystadenofibroma  Unspecified adverse effect of anesthesia 2010 \"Went into cardiac arrest after waking up from anesthesia\" ROS: 
An 8 point review of symptoms negative except for chief complaint as described above. PHYSICAL EXAM: 
Blood pressure 106/66, pulse 74, temperature 97.8 °F (36.6 °C), resp. rate 20, height 5' 2\" (1.575 m), weight 100.2 kg (221 lb), last menstrual period 06/13/2018. Constitutional: The patient appears well, alert, oriented x 3. Cardiovascular: Heart RRR, no murmurs. Respiratory: Lungs clear, no respiratory distress GI: Abdomen soft, nontender, no guarding No fundal tenderness Musculoskeletal: no cva tenderness Lower ext: no edema, neg angela's, reflexes +2 Skin: no rashes or lesions Psychiatric:Mood/ Affect: appropriate Genitourinary: SVE: long and closed; cerclage knot palpated FHT: 150s TOCO:no ctx Vaginal ultrasound: CL 4.38cm without funneling I personally reviewed pt's medical record including relevant labs and ultrasounds Assessment/Plan: No evidence of PTL. Pt is reassured. Pt discharged to home with PTL precautions. Pt to f/u with her PObP.

## 2018-11-14 PROBLEM — O09.92 HIGH-RISK PREGNANCY IN SECOND TRIMESTER: Status: ACTIVE | Noted: 2018-11-14

## 2018-11-14 PROBLEM — R10.2 PELVIC CRAMPING IN ANTEPARTUM PERIOD: Status: RESOLVED | Noted: 2018-11-03 | Resolved: 2018-11-14

## 2018-11-14 PROBLEM — O26.899 PELVIC CRAMPING IN ANTEPARTUM PERIOD: Status: RESOLVED | Noted: 2018-11-03 | Resolved: 2018-11-14

## 2018-11-14 PROBLEM — Z36.82 NUCHAL TRANSLUCENCY OF FETUS ON PRENATAL ULTRASOUND: Status: RESOLVED | Noted: 2018-09-06 | Resolved: 2018-11-14

## 2018-11-28 PROBLEM — O34.32 CERVICAL CERCLAGE SUTURE PRESENT IN SECOND TRIMESTER: Status: ACTIVE | Noted: 2018-11-28

## 2018-11-28 PROBLEM — O44.22 PARTIAL PLACENTA PREVIA WITHOUT HEMORRHAGE DURING PREGNANCY IN SECOND TRIMESTER: Status: RESOLVED | Noted: 2018-10-08 | Resolved: 2018-11-28

## 2018-12-16 ENCOUNTER — HOSPITAL ENCOUNTER (EMERGENCY)
Age: 23
Discharge: HOME | End: 2018-12-16
Attending: OBSTETRICS & GYNECOLOGY | Admitting: OBSTETRICS & GYNECOLOGY
Payer: COMMERCIAL

## 2018-12-16 VITALS — TEMPERATURE: 97.9 F | SYSTOLIC BLOOD PRESSURE: 114 MMHG | DIASTOLIC BLOOD PRESSURE: 57 MMHG | HEART RATE: 97 BPM

## 2018-12-16 PROBLEM — N94.9 ROUND LIGAMENT PAIN: Status: ACTIVE | Noted: 2018-12-16

## 2018-12-16 PROCEDURE — 76815 OB US LIMITED FETUS(S): CPT

## 2018-12-16 PROCEDURE — 99284 EMERGENCY DEPT VISIT MOD MDM: CPT

## 2018-12-16 PROCEDURE — 59025 FETAL NON-STRESS TEST: CPT

## 2018-12-16 PROCEDURE — 76817 TRANSVAGINAL US OBSTETRIC: CPT

## 2018-12-16 NOTE — PROGRESS NOTES
OB ED       Admit to [facility]. EFM and TOCO applied. States having LLQ pain x 24 hrs that has worsened today. Kept her awake last PM.  Has cerclage in place since 12 weeks. Hx of 25 week delivery. Abd palpated soft. Positive fetal movement noted.

## 2018-12-16 NOTE — DISCHARGE INSTRUCTIONS
Routine  labor precautions      Round Ligament Pain: Care Instructions  Your Care Instructions    Round ligament pain is a common pain during pregnancy. You may feel a sharp brief pain on one or both sides of your belly. It may go down into your groin. It's usually felt for the first time during the second trimester. This pain is a normal part of pregnancy. It will go away as your pregnancy continues or after your baby is born. Your uterus is supported by two ligaments that go from the top and sides of the uterus to the bones of the pelvis. These are the round ligaments. As your uterus grows, these ligaments stretch and tighten with your movements. This may be the cause of the pain. You may find that certain activities seem to cause pain. If you can, avoid those activities. Your doctor can usually diagnose round ligament pain from your symptoms and an exam. If you have bleeding or other symptoms, your doctor may also do an imaging test, such as an ultrasound. Your doctor may suggest that you take an over-the-counter pain medicine, such as acetaminophen. Follow-up care is a key part of your treatment and safety. Be sure to make and go to all appointments, and call your doctor if you are having problems. It's also a good idea to know your test results and keep a list of the medicines you take. How can you care for yourself at home? · If certain movements seem to trigger the pain, see if you can avoid them while you are pregnant. · Stay active. If your doctor says it's okay, try moderate exercise. Many pregnant women find that water exercise is most comfortable. Examples are swimming and water aerobics. · Ask your doctor about taking acetaminophen for pain. Be safe with medicines. Read and follow all instructions on the label. When should you call for help?   Call your doctor now or seek immediate medical care if:    · You think you might be in labor.     · You have new or worse pain.    Watch closely for changes in your health, and be sure to contact your doctor if you have any problems.

## 2018-12-16 NOTE — ED PROVIDER NOTES
Chief Complaint: LLQ pain      24 y.o. female  at 26w4d  weeks gestation who is seen for LLQ pain for 24 hours. Pain is along th course of the left round ligament. Pt notes good FM. She denies VB, LOF, nausea, vomiting, diarrhea, fevers, UTI or preeclamptic symptoms. She denies uterine ctx. Pt is s/p PTD at 25 weeks in . She is s/p cerclage by Dr. Sudeep Ocampo in the 1st trimester. HISTORY:    Social History     Substance and Sexual Activity   Sexual Activity Yes    Partners: Male    Birth control/protection: Condom     Patient's last menstrual period was 2018 (exact date).     Social History     Socioeconomic History    Marital status:      Spouse name: Not on file    Number of children: Not on file    Years of education: Not on file    Highest education level: Not on file   Social Needs    Financial resource strain: Not on file    Food insecurity - worry: Not on file    Food insecurity - inability: Not on file   [employer] needs - medical: Not on file   [employer] needs - non-medical: Not on file   Occupational History    Not on file   Tobacco Use    Smoking status: Never Smoker    Smokeless tobacco: Never Used   Substance and Sexual Activity    Alcohol use: No     Alcohol/week: 0.0 oz    Drug use: No    Sexual activity: Yes     Partners: Male     Birth control/protection: Condom   Other Topics Concern    Not on file   Social History Narrative    Not on file       Past Surgical History:   Procedure Laterality Date    HX KNEE ARTHROSCOPY Left     MCL; PCL    HX KNEE ARTHROSCOPY Right 1/5/15    MCL; PCL, filled micro fractures    HX PELVIC LAPAROSCOPY Right 2015    right ovarian cystectomy,chromopertubation,pelvic washings    HX WISDOM TEETH EXTRACTION         Past Medical History:   Diagnosis Date    Allergic reaction to food 2017    Anemia     Asthma     proair as needed; uses rarely    Broken bones     \"lots\" falls often    Fetal hydronephrosis in pregnancy, antepartum condition 10/8/2018    Hip dysplasia     bilateral and knee dysplasia    Hypothyroid in pregnancy, antepartum, first trimester 9/6/2018    Obesity affecting pregnancy in first trimester 9/6/2018    Ovarian cyst     cystadenofibroma    Unspecified adverse effect of anesthesia 2010    \"Went into cardiac arrest after waking up from anesthesia\"         ROS:  An 8 point review of symptoms negative except for chief complaint as described above. PHYSICAL EXAM:  Blood pressure 114/57, pulse 97, temperature 97.9 °F (36.6 °C), last menstrual period 06/13/2018. Constitutional: The patient appears well, alert, oriented x 3. GI: Abdomen soft, nontender, no guarding  No fundal tenderness  Musculoskeletal: no cva tenderness  Lower ext: no edema, neg angela's, reflexes +2  Psychiatric:Mood/ Affect: appropriate  Genitourinary: SVE: closed/cerclage knot palpated  FHT: Category 1 with mod variability  TOCO: No ctx  Abd ultrasound: active fetus; afv is normal  Vaginal ultrasound: CL is 2.6cm; no funneling    I personally reviewed pt's medical record including relevant labs and ultrasounds    Assessment/Plan:  Pt with probable left sided round ligament pain. Strategies to help with round ligament pain d/w pt. Pt discharged to home with PTL precautions. Pt has f/u appointment with JENNIFER early next week.

## 2018-12-19 PROBLEM — N94.9 ROUND LIGAMENT PAIN: Status: RESOLVED | Noted: 2018-12-16 | Resolved: 2018-12-19

## 2019-01-04 ENCOUNTER — HOSPITAL ENCOUNTER (OUTPATIENT)
Age: 24
Discharge: HOME | End: 2019-01-04
Attending: OBSTETRICS & GYNECOLOGY | Admitting: OBSTETRICS & GYNECOLOGY
Payer: COMMERCIAL

## 2019-01-04 VITALS
BODY MASS INDEX: 43.06 KG/M2 | HEIGHT: 62 IN | RESPIRATION RATE: 16 BRPM | WEIGHT: 234 LBS | TEMPERATURE: 98 F | DIASTOLIC BLOOD PRESSURE: 79 MMHG | SYSTOLIC BLOOD PRESSURE: 116 MMHG | HEART RATE: 84 BPM

## 2019-01-04 PROBLEM — N89.8 VAGINAL DISCHARGE IN PREGNANCY, THIRD TRIMESTER: Status: ACTIVE | Noted: 2019-01-04

## 2019-01-04 PROBLEM — O26.893 VAGINAL DISCHARGE IN PREGNANCY, THIRD TRIMESTER: Status: ACTIVE | Noted: 2019-01-04

## 2019-01-04 LAB
A1 MICROGLOB PLACENTAL VAG QL: NEGATIVE
A1 MICROGLOB PLACENTAL VAG QL: NEGATIVE
CONTROL LINE PRESENT?: NORMAL
CONTROL LINE PRESENT?: NORMAL
EXPIRATION DATE: NORMAL
EXPIRATION DATE: NORMAL
INTERNAL NEGATIVE CONTROL: NORMAL
INTERNAL NEGATIVE CONTROL: NORMAL
KIT LOT NO.: NORMAL
KIT LOT NO.: NORMAL

## 2019-01-04 PROCEDURE — 84112 EVAL AMNIOTIC FLUID PROTEIN: CPT | Performed by: OBSTETRICS & GYNECOLOGY

## 2019-01-04 PROCEDURE — 99283 EMERGENCY DEPT VISIT LOW MDM: CPT

## 2019-01-04 PROCEDURE — 76815 OB US LIMITED FETUS(S): CPT

## 2019-01-04 NOTE — ED PROVIDER NOTES
Chief Complaint: loss of fluid 24 y.o. female  at 29w2d 
weeks gestation who is seen for moderate vaginal leaking of fluid. Pt woke this am and had fluid soaking her sheets and fluid running down her leg. Reports it as clear and odorless HISTORY: 
 
Social History Substance and Sexual Activity Sexual Activity Yes  Partners: Male  Birth control/protection: Condom Patient's last menstrual period was 2018 (exact date). Social History Socioeconomic History  Marital status:  Spouse name: Not on file  Number of children: Not on file  Years of education: Not on file  Highest education level: Not on file Social Needs  Financial resource strain: Not on file  Food insecurity - worry: Not on file  Food insecurity - inability: Not on file  Transportation needs - medical: Not on file  Transportation needs - non-medical: Not on file Occupational History  Not on file Tobacco Use  Smoking status: Never Smoker  Smokeless tobacco: Never Used Substance and Sexual Activity  Alcohol use: No  
  Alcohol/week: 0.0 oz  Drug use: No  
 Sexual activity: Yes  
  Partners: Male Birth control/protection: Condom Other Topics Concern  Not on file Social History Narrative  Not on file Past Surgical History:  
Procedure Laterality Date  HX KNEE ARTHROSCOPY Left MCL; PCL  
 HX KNEE ARTHROSCOPY Right 1/5/15 MCL; PCL, filled micro fractures  HX PELVIC LAPAROSCOPY Right 2015  
 right ovarian cystectomy,chromopertubation,pelvic washings  HX WISDOM TEETH EXTRACTION Past Medical History:  
Diagnosis Date  Allergic reaction to food 2017  Anemia  Asthma   
 proair as needed; uses rarely  Broken bones \"lots\" falls often  Fetal hydronephrosis in pregnancy, antepartum condition 10/8/2018  Hip dysplasia   
 bilateral and knee dysplasia  Hypothyroid in pregnancy, antepartum, first trimester 2018  Obesity affecting pregnancy in first trimester 2018  Ovarian cyst   
 cystadenofibroma  Unspecified adverse effect of anesthesia  \"Went into cardiac arrest after waking up from anesthesia\" ROS: 
A 12 point review of symptoms negative except for chief complaint as described above. PHYSICAL EXAM: 
Blood pressure 116/79, pulse 84, temperature 98 °F (36.7 °C), resp. rate 16, height 5' 2\" (1.575 m), weight 106.1 kg (234 lb), last menstrual period 2018. Constitutional: The patient appears well, alert, oriented x 3. Cardiovascular: Heart RRR, no murmurs. Respiratory: Lungs clear, no respiratory distress GI: Abdomen soft, nontender, no guarding No fundal tenderness Musculoskeletal: no cva tenderness Upper ext: no edema, reflexes +2 Lower ext: no edema, neg angela's, reflexes +2 Skin: no rashes or lesions Psychiatric:Mood/ Affect: appropriate Genitourinary: SVE:deferred, cerclage in place FHT:+ 
TOCO:no contractions Brief bedside ultrasound- ant placenta grade 1, mani = 8, vertex 
amnisure- neg I personally reviewed pt's medical record including relevant labs and ultrasounds Assessment/Plan: 
25 yo  at 29w2d with concern for srom 
amnisure neg, had pt ambulate, cough and repeat ambulate negative Home with precautions

## 2019-01-04 NOTE — PROGRESS NOTES
Pt here with complaints of leaking of fluid. Pt reports getting up at 0400 and finding wet spot in bed; pt up to bathroom at that time and upon standing noticed fluid running down her legs. Pt went back to bed and noticed more fluid. Pt reports +FM and no bleeding. Pt also reports having cerclage in place.  Will notify MD of pt arrival.

## 2019-01-04 NOTE — PROGRESS NOTES
Discharge instructions given to pt. Pt verbalized understanding. Pt to ambulate out with out assistance.

## 2019-01-04 NOTE — DISCHARGE INSTRUCTIONS
Patient Education   Patient Education        Pregnancy Precautions: Care Instructions  Your Care Instructions    There is no sure way to prevent labor before your due date ( labor) or to prevent most other pregnancy problems. But there are things you can do to increase your chances of a healthy pregnancy. Go to your appointments, follow your doctor's advice, and take good care of yourself. Eat well, and exercise (if your doctor agrees). And make sure to drink plenty of water. Follow-up care is a key part of your treatment and safety. Be sure to make and go to all appointments, and call your doctor if you are having problems. It's also a good idea to know your test results and keep a list of the medicines you take. How can you care for yourself at home? · Make sure you go to your prenatal appointments. At each visit, your doctor will check your blood pressure. Your doctor will also check to see if you have protein in your urine. High blood pressure and protein in urine are signs of preeclampsia. This condition can be dangerous for you and your baby. · Drink plenty of fluids, enough so that your urine is light yellow or clear like water. Dehydration can cause contractions. If you have kidney, heart, or liver disease and have to limit fluids, talk with your doctor before you increase the amount of fluids you drink. · Tell your doctor right away if you notice any symptoms of an infection, such as:  ? Burning when you urinate. ? A foul-smelling discharge from your vagina. ? Vaginal itching. ? Unexplained fever. ? Unusual pain or soreness in your uterus or lower belly. · Eat a balanced diet. Include plenty of foods that are high in calcium and iron. ? Foods high in calcium include milk, cheese, yogurt, almonds, and broccoli. ? Foods high in iron include red meat, shellfish, poultry, eggs, beans, raisins, whole-grain bread, and leafy green vegetables. · Do not smoke.  If you need help quitting, talk to your doctor about stop-smoking programs and medicines. These can increase your chances of quitting for good. · Do not drink alcohol or use illegal drugs. · Follow your doctor's directions about activity. Your doctor will let you know how much, if any, exercise you can do. · Ask your doctor if you can have sex. If you are at risk for early labor, your doctor may ask you to not have sex. · Take care to prevent falls. During pregnancy, your joints are loose, and your balance is off. Sports such as bicycling, skiing, or in-line skating can increase your risk of falling. And don't ride horses or motorcycles, dive, water ski, scuba dive, or parachute jump while you are pregnant. · Avoid getting very hot. Do not use saunas or hot tubs. Avoid staying out in the sun in hot weather for long periods. Take acetaminophen (Tylenol) to lower a high fever. · Do not take any over-the-counter or herbal medicines or supplements without talking to your doctor or pharmacist first.  When should you call for help? Call 911 anytime you think you may need emergency care. For example, call if:    · You passed out (lost consciousness).     · You have severe vaginal bleeding.     · You have severe pain in your belly or pelvis.     · You have had fluid gushing or leaking from your vagina and you know or think the umbilical cord is bulging into your vagina. If this happens, immediately get down on your knees so your rear end (buttocks) is higher than your head. This will decrease the pressure on the cord until help arrives.   [facility] your doctor now or seek immediate medical care if:    · You have signs of preeclampsia, such as:  ? Sudden swelling of your face, hands, or feet. ? New vision problems (such as dimness or blurring). ? A severe headache.     · You have any vaginal bleeding.     · You have belly pain or cramping.     · You have a fever.     · You have had regular contractions (with or without pain) for an hour.  This means that you have 8 or more within 1 hour or 4 or more in 20 minutes after you change your position and drink fluids.     · You have a sudden release of fluid from your vagina.     · You have low back pain or pelvic pressure that does not go away.     · You notice that your baby has stopped moving or is moving much less than normal.    Watch closely for changes in your health, and be sure to contact your doctor if you have any problems. Where can you learn more? Go to http://micaela-dariel.info/. Enter [id number] in the search box to learn more about \"Pregnancy Precautions: Care Instructions. \"  Current as of: 2017  Content Version: 11.8  © 6988-7355 [employer]. Care instructions adapted under license by [employer] (which disclaims liability or warranty for this information). If you have questions about a medical condition or this instruction, always ask your healthcare professional. [place] [address] any warranty or liability for your use of this information. Weeks 26 to 30 of Your Pregnancy: Care Instructions  Your Care Instructions    You are now in your last trimester of pregnancy. Your baby is growing rapidly. And you'll probably feel your baby moving around more often. Your doctor may ask you to count your baby's kicks. Your back may ache as your body gets used to your baby's size and length. If you haven't already had the Tdap shot during this pregnancy, talk to your doctor about getting it. It will help protect your  against pertussis infection. During this time, it's important to take care of yourself and pay attention to what your body needs. If you feel sexual, explore ways to be close with your partner that match your comfort and desire. Use the tips provided in this care sheet to find ways to be sexual in your own way. Follow-up care is a key part of your treatment and safety.  Be sure to make and go to all appointments, and call your doctor if you are having problems. It's also a good idea to know your test results and keep a list of the medicines you take. How can you care for yourself at home? Take it easy at work  · Take frequent breaks. If possible, stop working when you are tired, and rest during your lunch hour. · Take bathroom breaks every 2 hours. · Change positions often. If you sit for long periods, stand up and walk around. · When you stand for a long time, keep one foot on a low stool with your knee bent. After standing a lot, sit with your feet up. · Avoid fumes, chemicals, and tobacco smoke. Be sexual in your own way  · Having sex during pregnancy is okay, unless your doctor tells you not to. · You may be very interested in sex, or you may have no interest at all. · Your growing belly can make it hard to find a good position during intercourse. Kreamer and explore. · You may get cramps in your uterus when your partner touches your breasts. · A back rub may relieve the backache or cramps that sometimes follow orgasm. Learn about  labor  · Watch for signs of  labor. You may be going into labor if:  ? You have menstrual-like cramps, with or without nausea. ? You have about 6 or more contractions in 1 hour, even after you have had a glass of water and are resting. ? You have a low, dull backache that does not go away when you change your position. ? You have pain or pressure in your pelvis that comes and goes in a pattern. ? You have intestinal cramping or flu-like symptoms, with or without diarrhea.  ? You notice an increase or change in your vaginal discharge. Discharge may be heavy, mucus-like, watery, or streaked with blood. ? Your water breaks. · If you think you have  labor:  ? Drink 2 or 3 glasses of water or juice. Not drinking enough fluids can cause contractions. ? Stop what you are doing, and empty your bladder.  Then lie down on your left side for at least 1 hour.  ? While lying on your side, find your breast bone. Put your fingers in the soft spot just below it. Move your fingers down toward your belly button to find the top of your uterus. Check to see if it is tight. ? Contractions can be weak or strong. Record your contractions for an hour. Time a contraction from the start of one contraction to the start of the next one.  ? Single or several strong contractions without a pattern are called Say-Kaplan contractions. They are practice contractions but not the start of labor. They often stop if you change what you are doing. ? Call your doctor if you have regular contractions. Where can you learn more? Go to http://micaela-dariel.info/. Enter H788 in the search box to learn more about \"Weeks 26 to 30 of Your Pregnancy: Care Instructions. \"  Current as of: November 21, 2017  Content Version: 11.8  © 9037-0402 [employer]. Care instructions adapted under license by [employer] (which disclaims liability or warranty for this information). If you have questions about a medical condition or this instruction, always ask your healthcare professional. [address] any warranty or liability for your use of this information.

## 2019-01-04 NOTE — PROGRESS NOTES
Dr. Calvin Montalvo at bedside; [address] [facility] preformed. Will repeat amnisure.

## 2019-01-05 ENCOUNTER — HOSPITAL ENCOUNTER (OUTPATIENT)
Age: 24
Discharge: HOME | End: 2019-01-06
Attending: OBSTETRICS & GYNECOLOGY | Admitting: OBSTETRICS & GYNECOLOGY
Payer: COMMERCIAL

## 2019-01-05 VITALS
SYSTOLIC BLOOD PRESSURE: 108 MMHG | RESPIRATION RATE: 18 BRPM | DIASTOLIC BLOOD PRESSURE: 55 MMHG | TEMPERATURE: 97.9 F | HEART RATE: 83 BPM
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
## 2019-01-06 PROCEDURE — 99282 EMERGENCY DEPT VISIT SF MDM: CPT

## 2019-01-06 NOTE — PROGRESS NOTES
Pt given discharge instructions- all questions answered and pt verbalizes understanding. Pt ambulated off unit in stable condition accompanied by friend.

## 2019-01-06 NOTE — H&P
Chief Complaint Patient presents with  Fall  
  29w3d  
 
 
24 y.o. female at 32w2d 
weeks gestation who is seen for evaluation after fall 2 hours ago. Was walking out of her house, fell directly on her abdomen. No pain, ctx, vb or lof. Has cerclage secondary to prior 25 week loss. Rh pos. Fetal movement has beennormal . HISTORY: 
 
Social History Substance and Sexual Activity Sexual Activity Yes  Partners: Male  Birth control/protection: Condom Patient's last menstrual period was 06/13/2018 (exact date). Social History Socioeconomic History  Marital status:  Spouse name: Not on file  Number of children: Not on file  Years of education: Not on file  Highest education level: Not on file Social Needs  Financial resource strain: Not on file  Food insecurity - worry: Not on file  Food insecurity - inability: Not on file  Transportation needs - medical: Not on file  Transportation needs - non-medical: Not on file Occupational History  Not on file Tobacco Use  Smoking status: Never Smoker  Smokeless tobacco: Never Used Substance and Sexual Activity  Alcohol use: No  
  Alcohol/week: 0.0 oz  Drug use: No  
 Sexual activity: Yes  
  Partners: Male Birth control/protection: Condom Other Topics Concern  Not on file Social History Narrative  Not on file Past Surgical History:  
Procedure Laterality Date  HX KNEE ARTHROSCOPY Left MCL; PCL  
 HX KNEE ARTHROSCOPY Right 1/5/15 MCL; PCL, filled micro fractures  HX PELVIC LAPAROSCOPY Right 5/6/2015  
 right ovarian cystectomy,chromopertubation,pelvic washings  HX WISDOM TEETH EXTRACTION Past Medical History:  
Diagnosis Date  Allergic reaction to food 12/28/2017  Anemia  Asthma   
 proair as needed; uses rarely  Broken bones \"lots\" falls often  Fetal hydronephrosis in pregnancy, antepartum condition 10/8/2018  Hip dysplasia   
 bilateral and knee dysplasia  Hypothyroid in pregnancy, antepartum, first trimester 9/6/2018  Obesity affecting pregnancy in first trimester 9/6/2018  Ovarian cyst   
 cystadenofibroma  Unspecified adverse effect of anesthesia 2010 \"Went into cardiac arrest after waking up from anesthesia\" ROS: 
Negative: 
 negative 10 point ROS except as noted in HPI Positive: 
 per hpi PHYSICAL EXAM: 
Blood pressure 108/55, pulse 83, temperature 97.9 °F (36.6 °C), resp. rate 18, last menstrual period 06/13/2018. The patient appears well, alert, oriented x 3. Appropriate affect. Lungs are clear. Heart RRR, no murmurs. Abdomen soft, non-tender, no rebound/guarding. Fundus soft and non tender Skin warm, dry, no rashes Ext no edema, DTR's normal 
 
Cervix: Deferred Fetal Heart Rate: Reactive Variability: moderate Accelerations: yes Decelerations: none Uterine contractions: none Assessment: 
24 y.o. female at 32w2d s/p fall. Plan: Will monitor for 4 hours post event. If stable with no regular contractions will d/c home with usual fu. 
 
Lashell Womack MD

## 2019-01-06 NOTE — PROGRESS NOTES
Patient presents to [facility] with complaint of slipping and falling in the mud on stomach at around 2220.

## 2019-01-06 NOTE — PROGRESS NOTES
MD requests pt be evaluated until 0215 and if FHT remain category 1 and no UC's are noted, then pt may be d/c to home.

## 2019-01-06 NOTE — DISCHARGE INSTRUCTIONS
Patient Education        Learning About Pregnancy  Your Care Instructions    Your health in the early weeks of your pregnancy is particularly important for your baby's health. Take good care of yourself. Anything you do that harms your body can also harm your baby. Make sure to go to all of your doctor appointments. Regular checkups will help keep you and your baby healthy. How can you care for yourself at home? Diet    · Eat a balanced diet. Make sure your diet includes plenty of beans, peas, and leafy green vegetables.     · Do not skip meals or go for many hours without eating. If you are nauseated, try to eat a small, healthy snack every 2 to 3 hours.     · Do not eat fish that has a high level of mercury, such as shark, swordfish, or mackerel. Do not eat more than one can of tuna each week.     · Drink plenty of fluids, enough so that your urine is light yellow or clear like water. If you have kidney, heart, or liver disease and have to limit fluids, talk with your doctor before you increase the amount of fluids you drink.     · Cut down on caffeine, such as coffee, tea, and cola.     · Do not drink alcohol, such as beer, wine, or hard liquor.     · Take a multivitamin that contains at least 400 micrograms (mcg) of folic acid to help prevent birth defects. Fortified cereal and whole wheat bread are good additional sources of folic acid.     · Increase the calcium in your diet. Try to drink a quart of skim milk each day. You may also take calcium supplements and choose foods such as cheese and yogurt.    Lifestyle    · Make sure you go to your follow-up appointments.     · Get plenty of rest. You may be unusually tired while you are pregnant.     · Get at least 30 minutes of exercise on most days of the week. Walking is a good choice. If you have not exercised in the past, start out slowly. Take several short walks each day.     · Do not smoke.  If you need help quitting, talk to your doctor about stop-smoking programs. These can increase your chances of quitting for good.     · Do not touch cat feces or litter boxes. Also, wash your hands after you handle raw meat, and fully cook all meat before you eat it. Wear gloves when you work in the yard or garden, and wash your hands well when you are done. Cat feces, raw or undercooked meat, and contaminated dirt can cause an infection that may harm your baby or lead to a miscarriage.     · Do not use saunas or hot tubs. Raising your body temperature may harm your baby.     · Avoid chemical fumes, paint fumes, or poisons.     · Do not use illegal drugs or alcohol. Medicines    · Review all of your medicines with your doctor. Some of your routine medicines may need to be changed to protect your baby.     · Use acetaminophen (Tylenol) to relieve minor problems, such as a mild headache or backache or a mild fever with cold symptoms. Do not use nonsteroidal anti-inflammatory drugs (NSAIDs), such as ibuprofen (Advil, Motrin) or naproxen (Aleve), unless your doctor says it is okay.     · Do not take two or more pain medicines at the same time unless the doctor told you to. Many pain medicines have acetaminophen, which is Tylenol. Too much acetaminophen (Tylenol) can be harmful.     · Take your medicines exactly as prescribed. Call your doctor if you think you are having a problem with your medicine.    To manage morning sickness    · If you feel sick when you first wake up, try eating a small snack (such as crackers) before you get out of bed. Allow some time to digest the snack, and then get out of bed slowly.     · Do not skip meals or go for long periods without eating. An empty stomach can make nausea worse.     · Eat small, frequent meals instead of three large meals each day.     · Drink plenty of fluids.  Sports drinks, such as Gatorade or Powerade, are good choices.     · Eat foods that are high in protein but low in fat.     · If you are taking iron supplements, ask your doctor if they are necessary. Iron can make nausea worse.     · Avoid any smells, such as coffee, that make you feel sick.     · Get lots of rest. Morning sickness may be worse when you are tired. Follow-up care is a key part of your treatment and safety. Be sure to make and go to all appointments, and call your doctor if you are having problems. It's also a good idea to know your test results and keep a list of the medicines you take. Where can you learn more? Go to http://micaela-dariel.info/. Enter R433 in the search box to learn more about \"Learning About Pregnancy. \"  Current as of: November 21, 2017  Content Version: 11.8  © 0638-4405 [employer]. Care instructions adapted under license by [employer] (which disclaims liability or warranty for this information). If you have questions about a medical condition or this instruction, always ask your healthcare professional. [address] any warranty or liability for your use of this information.

## 2019-01-16 ENCOUNTER — HOSPITAL ENCOUNTER (OUTPATIENT)
Dept: LAB | Age: 24
Discharge: HOME | End: 2019-01-16
Attending: OBSTETRICS & GYNECOLOGY
Payer: COMMERCIAL

## 2019-01-16 PROBLEM — O99.213 OBESITY AFFECTING PREGNANCY IN THIRD TRIMESTER: Status: ACTIVE | Noted: 2018-09-06

## 2019-01-16 PROBLEM — O09.93 HIGH-RISK PREGNANCY IN THIRD TRIMESTER: Status: ACTIVE | Noted: 2018-11-14

## 2019-01-16 PROBLEM — N89.8 VAGINAL DISCHARGE IN PREGNANCY, THIRD TRIMESTER: Status: RESOLVED | Noted: 2019-01-04 | Resolved: 2019-01-16

## 2019-01-16 PROBLEM — O99.283 HYPOTHYROIDISM AFFECTING PREGNANCY IN THIRD TRIMESTER: Status: ACTIVE | Noted: 2018-09-06

## 2019-01-16 PROBLEM — O26.893 VAGINAL DISCHARGE IN PREGNANCY, THIRD TRIMESTER: Status: RESOLVED | Noted: 2019-01-04 | Resolved: 2019-01-16

## 2019-01-16 PROBLEM — O34.33 CERVICAL INSUFFICIENCY DURING PREGNANCY IN THIRD TRIMESTER, ANTEPARTUM: Status: ACTIVE | Noted: 2018-09-11

## 2019-01-16 PROBLEM — O09.893 HISTORY OF PRETERM DELIVERY, CURRENTLY PREGNANT IN THIRD TRIMESTER: Status: ACTIVE | Noted: 2018-09-06

## 2019-01-16 PROBLEM — O36.5930 POOR FETAL GROWTH AFFECTING MANAGEMENT OF MOTHER IN THIRD TRIMESTER: Status: ACTIVE | Noted: 2019-01-16
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
## 2019-01-16 LAB — FIBRONECTIN FETAL VAG QL: NEGATIVE

## 2019-01-16 PROCEDURE — 82731 ASSAY OF FETAL FIBRONECTIN: CPT

## 2019-01-17 PROBLEM — F41.9 ANXIETY IN PREGNANCY IN THIRD TRIMESTER, ANTEPARTUM: Status: ACTIVE | Noted: 2019-01-17

## 2019-01-17 PROBLEM — O99.343 ANXIETY IN PREGNANCY IN THIRD TRIMESTER, ANTEPARTUM: Status: ACTIVE | Noted: 2019-01-17

## 2019-01-23 ENCOUNTER — HOSPITAL ENCOUNTER (INPATIENT)
Age: 24
LOS: 4 days | Discharge: HOME | End: 2019-01-29
Attending: OBSTETRICS & GYNECOLOGY | Admitting: OBSTETRICS & GYNECOLOGY
Payer: COMMERCIAL

## 2019-01-23 ENCOUNTER — HOSPITAL ENCOUNTER (OUTPATIENT)
Dept: LAB | Age: 24
Discharge: HOME | End: 2019-01-23
Attending: OBSTETRICS & GYNECOLOGY
Payer: COMMERCIAL

## 2019-01-23 DIAGNOSIS — O99.343 ANXIETY IN PREGNANCY IN THIRD TRIMESTER, ANTEPARTUM: Primary | ICD-10-CM

## 2019-01-23 DIAGNOSIS — F41.9 ANXIETY IN PREGNANCY IN THIRD TRIMESTER, ANTEPARTUM: Primary | ICD-10-CM

## 2019-01-23 PROBLEM — Z34.90 PREGNANCY: Status: ACTIVE | Noted: 2019-01-23

## 2019-01-23 LAB — FIBRONECTIN FETAL VAG QL: POSITIVE

## 2019-01-23 PROCEDURE — 82731 ASSAY OF FETAL FIBRONECTIN: CPT

## 2019-01-23 PROCEDURE — 99218 HC RM OBSERVATION: CPT

## 2019-01-23 PROCEDURE — 74011250637 HC RX REV CODE- 250/637: Performed by: OBSTETRICS & GYNECOLOGY

## 2019-01-23 PROCEDURE — 74011250636 HC RX REV CODE- 250/636: Performed by: OBSTETRICS & GYNECOLOGY

## 2019-01-23 RX ORDER — ZOLPIDEM TARTRATE 5 MG/1
5 TABLET ORAL
Status: DISCONTINUED | OUTPATIENT
Start: 2019-01-23 | End: 2019-01-27

## 2019-01-23 RX ORDER — BETAMETHASONE SODIUM PHOSPHATE AND BETAMETHASONE ACETATE 3; 3 MG/ML; MG/ML
12 INJECTION, SUSPENSION INTRA-ARTICULAR; INTRALESIONAL; INTRAMUSCULAR; SOFT TISSUE EVERY 24 HOURS
Status: DISCONTINUED | OUTPATIENT
Start: 2019-01-23 | End: 2019-01-23

## 2019-01-23 RX ORDER — BETAMETHASONE SODIUM PHOSPHATE AND BETAMETHASONE ACETATE 3; 3 MG/ML; MG/ML
12 INJECTION, SUSPENSION INTRA-ARTICULAR; INTRALESIONAL; INTRAMUSCULAR; SOFT TISSUE EVERY 24 HOURS
Status: COMPLETED | OUTPATIENT
Start: 2019-01-23 | End: 2019-01-24

## 2019-01-23 RX ORDER — NIFEDIPINE 30 MG/1
30 TABLET, EXTENDED RELEASE ORAL EVERY 12 HOURS
Status: DISCONTINUED | OUTPATIENT
Start: 2019-01-23 | End: 2019-01-26

## 2019-01-23 RX ORDER — ACETAMINOPHEN 500 MG
1000 TABLET ORAL
Status: DISCONTINUED | OUTPATIENT
Start: 2019-01-23 | End: 2019-01-27

## 2019-01-23 RX ORDER — FAMOTIDINE 20 MG/1
20 TABLET, FILM COATED ORAL
Status: DISCONTINUED | OUTPATIENT
Start: 2019-01-23 | End: 2019-01-29 | Stop reason: HOSPADM

## 2019-01-23 RX ADMIN — BETAMETHASONE ACETATE AND BETAMETHASONE SODIUM PHOSPHATE 12 MG: 3; 3 INJECTION, SUSPENSION INTRA-ARTICULAR; INTRALESIONAL; INTRAMUSCULAR; SOFT TISSUE at 13:44

## 2019-01-23 RX ADMIN — NIFEDIPINE 30 MG: 30 TABLET, FILM COATED, EXTENDED RELEASE ORAL at 13:43

## 2019-01-23 RX ADMIN — ACETAMINOPHEN 1000 MG: 500 TABLET, FILM COATED ORAL at 21:32

## 2019-01-23 NOTE — H&P
History & Physical 
 
Name: Yahir Perez MRN: [id number]  SSN: [id number] YOB: 1995  Age: 24 y.o. Sex: female Subjective:  
 
Reason for Admission:  Pregnancy and Cervical Incompetence and  Labor, +FFN History of Present Illness: Ms. Cole Kuo is a 24 y.o.  female with an estimated gestational age of 30w0d with Estimated Date of Delivery: 3/20/19. Patient complains of mild contractions for 2 weeks. Pregnancy has been complicated by cervical incompetence and  labor. Patient denies abdominal pain  , right upper quadrant pain  , shortness of breath, vaginal bleeding  and vaginal leaking of fluid . OB History  Para Term  AB Living 3 1 0 1 1 0  
SAB TAB Ectopic Molar Multiple Live Births 1 0 0 0 0 1 # Outcome Date GA Lbr Musa/2nd Weight Sex Delivery Anes PTL Lv  
3 Current           
2 SAB 01/15/18 8w2d       FD  
1  11/25/15 25w0d   F Vag-Spont  Y FD Complications: Abruptio Placenta, premature rupture of membranes (PPROM) delivered, current hospitalization Birth Comments: transferred to [facility] from [facility] after pt was found to have a shortened cervix at reg OB visit. Pt had had increase in vag discharge for 2 weeks prior to finding of shortened cervix Past Medical History:  
Diagnosis Date  Allergic reaction to food 2017  Anemia  Asthma   
 proair as needed; uses rarely  Broken bones \"lots\" falls often  Fetal hydronephrosis in pregnancy, antepartum condition 10/8/2018  Hip dysplasia   
 bilateral and knee dysplasia  Hypothyroid in pregnancy, antepartum, first trimester 2018  Obesity affecting pregnancy in first trimester 2018  Ovarian cyst   
 cystadenofibroma  Unspecified adverse effect of anesthesia  \"Went into cardiac arrest after waking up from anesthesia\" Past Surgical History:  
Procedure Laterality Date  HX KNEE ARTHROSCOPY Left  MCL; PCL  
  HX KNEE ARTHROSCOPY Right 1/5/15 MCL; PCL, filled micro fractures  HX PELVIC LAPAROSCOPY Right 5/6/2015  
 right ovarian cystectomy,chromopertubation,pelvic washings  HX WISDOM TEETH EXTRACTION Social History Occupational History  Not on file Tobacco Use  Smoking status: Never Smoker  Smokeless tobacco: Never Used Substance and Sexual Activity  Alcohol use: No  
  Alcohol/week: 0.0 oz  Drug use: No  
 Sexual activity: Yes  
  Partners: Male Birth control/protection: Condom Family History Problem Relation Age of Onset  Cancer Mother   
     cervical  
 Endometriosis Mother  Hypertension Mother  Diabetes Mother   
     prediabetes  Diabetes Maternal Grandmother  Breast Cancer Maternal Grandmother [de-identified]  
 Ovarian Cancer Maternal Grandmother 36  Endometriosis Maternal Grandmother  Diabetes Maternal Grandfather  Diabetes Paternal Grandmother  Cancer Paternal Grandmother   
     uterine  Endometriosis Paternal Grandmother  Diabetes Paternal Grandfather  Cancer Paternal Grandfather   
     small cell ca  Other Paternal Grandfather   
     polio  Diabetes Maternal Aunt Allergies Allergen Reactions  Morphine Anaphylaxis  Shellfish Containing Products Shortness of Breath  Shellfish Derived Hives and Swelling  Codeine Hives and Swelling  Doxycycline Hives, Nausea and Vomiting and Swelling  Lortab [Hydrocodone-Acetaminophen] Hives and Nausea and Vomiting  Pcn [Penicillins] Hives and Swelling  Strawberry Hives and Nausea and Vomiting  Tramadol Hives  Zithromax [Azithromycin] Hives and Swelling Prior to Admission medications Medication Sig Start Date End Date Taking? Authorizing Provider PROGESTERONE IM by IntraMUSCular route every seven (7) days.     Provider, Historical  
acetaminophen (TYLENOL EXTRA STRENGTH) 500 mg tablet Take  by mouth every six (6) hours as needed for Pain. Provider, Historical  
aspirin 81 mg chewable tablet Take 81 mg by mouth daily. Provider, Historical  
levothyroxine (SYNTHROID) 75 mcg tablet Take  by mouth Daily (before breakfast). Provider, Historical  
PRENATAL VIT CALC,IRON,FOLIC (PRENATAL #2 PO) Take  by mouth. Provider, Historical  
  
 
Review of Systems: A comprehensive review of systems was negative except for that written in the History of Present Illness. Objective:  
 
Vitals: There were no vitals filed for this visit. No data recorded. No Data Recorded Physical Exam: 
Patient without distress. Heart: Regular rate and rhythm Lung: clear to auscultation throughout lung fields, no wheezes, no rales, no rhonchi and normal respiratory effort Abdomen: soft, nontender Cervical Exam: 0 cm dilated 80% effaced   
-2 station Membranes:  Intact Uterine Activity:  Intensity: mild Fetal Heart Rate:  Reactive Lab/Data Review: 
Recent Results (from the past 24 hour(s)) FETAL FIBRONECTIN Collection Time: 19  9:15 AM  
Result Value Ref Range Fetal fibronectin POSITIVE (A) NEG Assessment and Plan:  
 
Principal Problem: 
  History of  delivery, currently pregnant in third trimester (2018) Overview: 2018 at [facility]:  Patient has a history of  labor in prior  
    pregnancy. She delivered at 25 weeks at [facility] 2015. Baby lived 3 days. She was found to have shortened cervix at regular OB visit at 24 weeks,  
    then went into labor, failed tocolysis. Unclear if cervical  
    insufficiency versus PTL. I explained to patient and  that with  
    this kind of history, 50% of patients may go to term without any  
    treatment. I discussed with her at length about possible cervical  
    incompetence and that at this time we cannot be sure that she will deliver  
    early. In an effort to prevent recurrent  birth, I offered cervical  
    surveillance in pregnancy. If cervical shortening occurs in pregnancy, a  
    cervical cerclage may be placed prior to 24 weeks, alternatively, a  
    vaginal pessary may be used for cervical support. I also offered  
    prophylactic cerclage placement. They do not want any chance of loss again  
    and want cerclage placed now. Cerclage scheduled for 9/10 at 9am. 
     
    See Cervical Insufficiency Problem Overview Active Problems: Hypothyroidism affecting pregnancy in third trimester (2018) Overview:  TSH 4.62, FT4 1.41 
    2018 at [facility]:  Taking synthroid 50mcg daily 10/8/2018 at [facility]:  10/3 TSH 2.610; taking synthroid 50mcg daily. 2018 at [facility]:  Taking Synthroid 75 mcg daily; this dose x ~ 1 month 
    2018 at [facility]:  Synthroid 75mcg daily. 2018 at [facility]:  Taking Synthroid 75 mcg daily. 2019 at [facility]: Taking synthroid 75mcg daily · Recommend evaluation of TSH and FT4 each trimester. Goal to keep TSH  
    <2-2.5. · If medication change, repeat TFTs in 4 weeks. · If poorly controlled thyroid, recommend growth evaluation in 3rd  
    trimester. High-risk pregnancy in third trimester (2018) Overview: 2018 at [facility]:  Normal NT and nasal bone. Declines genetic testing. Had [id number] drawn at [facility] x3, Male fetus. Genetic counseling done by  
    MD. 
    2018 at [facility]:  Normal Anatomy/Fetal Echo with exception of bilateral  
    fetal hydro. 2018 at [facility]:  Appropriate fetal growth. AC 36%, overall 49%, DEMETRIA  
    14.3 cm. See Fetal Hydronephrosis Overview Cervical cerclage suture present in second trimester (2018) Overview: See Cervical Insufficiency Overview Poor fetal growth affecting management of mother in third trimester (2019) Overview: 2019 at [facility]: Placenta [place], reassuring fetal status;  AC 7%,  
    overall 28%, DEMETRIA 13 cm, UA Dopplers WNL, BPP  · Decrease activity and increase calories See Cervical Insufficiency Problem Overview Anxiety in pregnancy in third trimester, antepartum (2019) Overview: Pt's  recently told pt he wanted a divorce-19  Pt upset, very  
    anxious, not sleeping. Pt and husb have 3 children they have fostering  
    and are planning to adopt. 19 at [facility]:  We took pt out of work today due to IUGR and shortened  
    cervix · Note given for pt to come out of work · Seeing counselor next week  · Use benedryl, melatonin or unisom to sleep Pregnancy (2019) - Cervical Incompetence:  Continues Tocodynamometer monitoring. 
-  Labor:  48 hour course of steroids to promote fetal lung maturity. Start oral Procardia Continuous Tocodynamometer Neonatology consult [facility] notified Signed By:  Wily Soria MD   
 2019

## 2019-01-23 NOTE — PROGRESS NOTES
01/23/19 1459 Fetal Vital Signs Mode External  
Fetal Heart Rate 130 Fetal Activity Present Variability 6-25 BPM  
Decelerations None Accelerations Yes RN Reviewed Strip? Yes Non Stress Test Reactive Uterine Activity Mode External  
Frequency (min) 0

## 2019-01-24 PROCEDURE — 59025 FETAL NON-STRESS TEST: CPT

## 2019-01-24 PROCEDURE — 74011250637 HC RX REV CODE- 250/637: Performed by: OBSTETRICS & GYNECOLOGY

## 2019-01-24 PROCEDURE — 99218 HC RM OBSERVATION: CPT

## 2019-01-24 RX ADMIN — NIFEDIPINE 30 MG: 30 TABLET, FILM COATED, EXTENDED RELEASE ORAL at 14:34

## 2019-01-24 RX ADMIN — ACETAMINOPHEN 1000 MG: 500 TABLET, FILM COATED ORAL at 11:21

## 2019-01-24 RX ADMIN — FAMOTIDINE 20 MG: 20 TABLET ORAL at 21:59

## 2019-01-24 RX ADMIN — BETAMETHASONE ACETATE AND BETAMETHASONE SODIUM PHOSPHATE 12 MG: 3; 3 INJECTION, SUSPENSION INTRA-ARTICULAR; INTRALESIONAL; INTRAMUSCULAR; SOFT TISSUE at 14:34

## 2019-01-24 RX ADMIN — ACETAMINOPHEN 1000 MG: 500 TABLET, FILM COATED ORAL at 01:53

## 2019-01-24 RX ADMIN — NIFEDIPINE 30 MG: 30 TABLET, FILM COATED, EXTENDED RELEASE ORAL at 01:48

## 2019-01-24 RX ADMIN — FAMOTIDINE 20 MG: 20 TABLET ORAL at 05:24

## 2019-01-24 NOTE — CONSULTS
Neonatology  Consult'    Consult requested by Dr. Moose Christensen  Reason for consult:  labor/incompetent cervix at 32 weeks, previous 25 week delivery at [facility], baby . Findings: Mother's Date of Admission: 2019 11:57 AM       Marielena Mcrae is 24 y.o.  Estimated Date of Delivery: 3/20/19, making her 32 1/7 weeks. 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 She has a cerclage for incompetent cervix. She was admitted for PTL, which is slowing down. Previous pregnancy resulted in a 25 week infant born at [facility],  after a few days of life. She does not smoke, use alcohol, or drugs. She noted that she is having difficulty with baby's dad and it's a complicated situation. She is currently being given betamethasone, 2nd dose today. Ultrasound notable for fetal hydronephrosis b/l and IUGR. MEMBRANE STATUS: Membrane Status: Intact      Current Facility-Administered Medications   Medication Dose Route Frequency    NIFEdipine ER (PROCARDIA XL) tablet 30 mg  30 mg Oral Q12H    acetaminophen (TYLENOL) tablet 1,000 mg  1,000 mg Oral Q6H PRN    zolpidem (AMBIEN) tablet 5 mg  5 mg Oral QHS PRN    famotidine (PEPCID) tablet 20 mg  20 mg Oral BID PRN        Feeding: plans to breast feed, I reviewed pumping with her      Items below were discussed with parents:      I met with Ms. Bettye Prieto and her family (two aunts and one uncle) and we discussed prematurity at 26 weeks. She had a difficult experience with her last pregnancy as the baby  after a few days. Her aunt also had experience with prematurity of her child, who is now an adult and doing well.  We discussed neonatology coverage, criteria that a baby needs to meet for admission to our NICU, what to expect in the delivery room, respiratory distress, surfactant, IVF, gavage feeds and breast milk pumping, umbilical lines, length of stay, visitation policy, and I walked her through our unit verbally to help her understand that it will be a different experience than [facility] (we are small with private rooms). She expressed multiple times how the alarms and entire NICU experience previously was quite difficult and expressed concern that baby will be too small to stay at [address]. I answered all of her and the family members' questions. I spent 60 minutes on this consult, the majority of the time in the room talking to the patient. Thank you for the consult, please let us know if she has any further questions.   Recommendations:   Continue current plan with Dr. Basilio Marin and Dr. Rhiannon Simon  Please keep the NICU updated on her progress    Appropriate for infant to be delivered here: Yes  Total consultation time 60 minutes  Greater than 50% spent with patient: yes    Quirino Yoo MD

## 2019-01-24 NOTE — PROGRESS NOTES
Assessment complete at this time per flow sheet. Vital signs stable. Had a headache last night but better now. Patient denies abdominal pain, contractions, headache, nausea and vomiting, pelvic pressure, right upper quadrant pain, shortness of breath, vaginal bleeding, vaginal leaking of fluid and visual disturbances. Patient states positive fetal movement. Encouraged to call as needed.

## 2019-01-24 NOTE — PROGRESS NOTES
Ante Partum High Risk Pregnancy Note Patient admitted for cervical incompetence and  labor states she does have mild headache , normal fetal movement and on/off ctx. Magalis Villanueva LOS:   
Vitals: Temp (24hrs), Av.2 °F (36.8 °C), Min:98.1 °F (36.7 °C), Max:98.2 °F (36.8 °C) Patient Vitals for the past 24 hrs: 
 BP  
19 0152 141/85  
19 1338 137/84  
19 1335 137/84 I&O:   No intake/output data recorded. No intake/output data recorded. Exam:  Patient without distress. Abdomen: soft, non-tender Fundus: soft and non tender Fundal Height:  cm 
             Right Upper Quadrant: non-tender Perineum: No sign of blood or amniotic fluid Lower Extremities: No 
             Patellar Reflexes: 1+ bilaterally Clonus: absent Fetal Monitoring:  Accelerations: Reactive Uterine Activity: None NST:  reactive Lab/Data Review: All lab results for the last 24 hours reviewed. Assessment and Plan:   
 
Principal Problem: 
  History of  delivery, currently pregnant in third trimester (2018) Overview: 2018 at [facility]:  Patient has a history of  labor in prior  
    pregnancy. She delivered at 25 weeks at [facility] 2015. Baby lived 3 days. She was found to have shortened cervix at regular OB visit at 24 weeks,  
    then went into labor, failed tocolysis. Unclear if cervical  
    insufficiency versus PTL. I explained to patient and  that with  
    this kind of history, 50% of patients may go to term without any  
    treatment. I discussed with her at length about possible cervical  
    incompetence and that at this time we cannot be sure that she will deliver  
    early. In an effort to prevent recurrent  birth, I offered cervical  
    surveillance in pregnancy.  If cervical shortening occurs in pregnancy, a  
 cervical cerclage may be placed prior to 24 weeks, alternatively, a  
    vaginal pessary may be used for cervical support. I also offered  
    prophylactic cerclage placement. They do not want any chance of loss again  
    and want cerclage placed now. Cerclage scheduled for 9/10 at 9am. 
     
    See Cervical Insufficiency Problem Overview Active Problems: Hypothyroidism affecting pregnancy in third trimester (9/6/2018) Overview: 8/1 TSH 4.62, FT4 1.41 
    9/6/2018 at [facility]:  Taking synthroid 50mcg daily 10/8/2018 at [facility]:  10/3 TSH 2.610; taking synthroid 50mcg daily. 11/14/2018 at [facility]:  Taking Synthroid 75 mcg daily; this dose x ~ 1 month 
    11/28/2018 at [facility]:  Synthroid 75mcg daily. 12/19/2018 at [facility]:  Taking Synthroid 75 mcg daily. 1/16/2019 at [facility]: Taking synthroid 75mcg daily · Recommend evaluation of TSH and FT4 each trimester. Goal to keep TSH  
    <2-2.5. · If medication change, repeat TFTs in 4 weeks. · If poorly controlled thyroid, recommend growth evaluation in 3rd  
    trimester. High-risk pregnancy in third trimester (11/14/2018) Overview: 9/6/2018 at [facility]:  Normal NT and nasal bone. Declines genetic testing. Had [id number] drawn at [facility] x3, Male fetus. Genetic counseling done by  
    MD. 
    11/14/2018 at [facility]:  Normal Anatomy/Fetal Echo with exception of bilateral  
    fetal hydro. 12/19/2018 at [facility]:  Appropriate fetal growth. AC 36%, overall 49%, DEMETRIA  
    14.3 cm. See Fetal Hydronephrosis Overview Cervical cerclage suture present in second trimester (11/28/2018) Overview: See Cervical Insufficiency Overview Poor fetal growth affecting management of mother in third trimester (1/16/2019)     Overview:  
    1/16/2019 at [facility]: Placenta [place], reassuring fetal status;  AC 7%,  
    overall 28%, DEMETRIA 13 cm, UA Dopplers WNL, BPP 8/8 
 · Decrease activity and increase calories See Cervical Insufficiency Problem Overview Anxiety in pregnancy in third trimester, antepartum (2019) Overview: Pt's  recently told pt he wanted a divorce-19  Pt upset, very  
    anxious, not sleeping. Pt and husb have 3 children they have fostering  
    and are planning to adopt. 19 at [facility]:  We took pt out of work today due to IUGR and shortened  
    cervix · Note given for pt to come out of work · Seeing counselor next week  · Use benedryl, melatonin or unisom to sleep Pregnancy (2019)  Labor:  48 hour course of steroids to promote fetal lung maturity. Start oral Procardia Set up for (HUAM) Home Uterine Activity Monitoring when  labor is controlled and discharge is being planned Neonatology consult Cervical Incompetence:  cerclage in place Remains stable, if ok per MFM, send home on bedrest,

## 2019-01-24 NOTE — PROGRESS NOTES
01/24/19 1120 Fetal Vital Signs Mode External  
Fetal Heart Rate 125 Fetal Activity Present Variability 6-25 BPM  
Decelerations None Accelerations Yes RN Reviewed Strip? Yes Non Stress Test Reactive Uterine Activity Mode External  
Frequency (min) 0

## 2019-01-24 NOTE — PROGRESS NOTES
14:34 Medication Given NIFEdipine ER (PROCARDIA XL) tablet 30 mg -  Dose: 30 mg ; Route: Oral ; Scheduled Time: 1400  [facility]  
14:34 Medication Given betamethasone (CELESTONE) injection 12 mg -  Dose: 12 mg ; Route: IntraMUSCular ; Site: Left Upper Outer Quadrant ; [facility]

## 2019-01-24 NOTE — PROGRESS NOTES
Attempted to meet with patient. Patient with multiple visitors - brief introduction made.  will check back later. Ji Mccabe De Postas 33

## 2019-01-24 NOTE — PROGRESS NOTES
Shift assessment complete , patient denies any contractions, leaking of fluid or bleeding reports good fetal movement. Patient complains of H/A request tylenol

## 2019-01-25 PROCEDURE — 59025 FETAL NON-STRESS TEST: CPT

## 2019-01-25 PROCEDURE — 65270000029 HC RM PRIVATE

## 2019-01-25 PROCEDURE — 74011250637 HC RX REV CODE- 250/637: Performed by: OBSTETRICS & GYNECOLOGY

## 2019-01-25 PROCEDURE — 99218 HC RM OBSERVATION: CPT

## 2019-01-25 RX ADMIN — ACETAMINOPHEN 1000 MG: 500 TABLET, FILM COATED ORAL at 08:36

## 2019-01-25 RX ADMIN — NIFEDIPINE 30 MG: 30 TABLET, FILM COATED, EXTENDED RELEASE ORAL at 14:02

## 2019-01-25 RX ADMIN — NIFEDIPINE 30 MG: 30 TABLET, FILM COATED, EXTENDED RELEASE ORAL at 01:28

## 2019-01-25 RX ADMIN — FAMOTIDINE 20 MG: 20 TABLET ORAL at 23:25

## 2019-01-25 NOTE — PROGRESS NOTES
[facility] January 25, 2019 RE: Yordy Logan To Whom it May Concern: This is to certify that Yordy Logan has been in the hospital since 1/23/2019. Sincerely, Tania Carrillo RN

## 2019-01-25 NOTE — PHYSICIAN ADVISORY
Letter of Determination: Upgrade from Observation to Inpatient Status This patient was originally hospitalized as Outpatient Status with Observation Services on 2019 for  labor. This patient now meets for Inpatient Admission in accordance with CMS regulation Section 43 .3. Specifically, patient's stay is now over Two Midnights and was medically necessary. The patient's stay was medically necessary based on history of pre-term delivery, cerclage placement, and medical plan for strict bedrest and monitoring beyond the observation period. It is our recommendation that this patient's hospitalization status should be upgraded from OBSERVATION to INPATIENT status.  
  
The final decision regarding the patient's hospitalization status depends on the attending physician's judgement. Eduardo Cee MD, CHANTEL, Physician Advisor [address] [place] [address].

## 2019-01-25 NOTE — PROGRESS NOTES
spent 1+ hour with patient and patient's sister. Patient was provided the opportunity to discuss current stressors. Per patient, it was recently made known to her that her  is having an affair. Patient/ are currently fostering three young children (ages 1, 2, 3) with plans to adopt. EDC: 3/20/19. Patient states that she's unsure how long she will remain in the hospital.   
 
Patient feels that her baby (male - Enzo) will be delivered soon due to the extensive amount of stress that she's under. Patient has met with neonatologist and received education on anticipated NICU management if baby is born prematurely. Sadly, patient experienced a 25 week loss in 2015. She states that she still \"remembers the beeps and alarms\" and is anxious about having another child in the NICU. Discussed patient's support system. Patient states that her mother has moved into her home in order to look after her three foster children. Additionally, she has the support of many friends/family members. Patient explained that she's somewhat concerned about paying her bills due to being out of work. She is unsure if she will receive any financial support from her . She states that her father may be able to assist some with this. Patient reports that she has already contacted a  and initiated divorce proceedings. Emotional support provided to patient. Patient appears to have adequate support from family/friends.  will continue to follow. Ji Thomas De Postas 34

## 2019-01-25 NOTE — PROGRESS NOTES
Ante Partum High Risk Pregnancy Note Patient admitted for cervical incompetence and  labor states she does not  have  vaginal bleeding  and vaginal leaking of fluid . LOS:   
Vitals: Temp (24hrs), Av.2 °F (36.8 °C), Min:98 °F (36.7 °C), Max:98.6 °F (37 °C) Patient Vitals for the past 24 hrs: 
 BP  
191 128/73  
19 117/63  
19 135/77  
19 135/79  
19 136/89  
19 132/84  
19 1436 134/81  
19 1435 134/81 I&O:   No intake/output data recorded. No intake/output data recorded. Exam:  Patient without distress. Abdomen: soft, non-tender Fundus: soft and non tender Fundal Height:  cm 
             Right Upper Quadrant: non-tender Perineum: No sign of blood or amniotic fluid Lower Extremities: No 
             Patellar Reflexes: 1+ bilaterally Clonus: absent Fetal Monitoring:  Accelerations: Reactive Uterine Activity: None NST:  reactive Lab/Data Review: All lab results for the last 24 hours reviewed. Assessment and Plan:   
 
Principal Problem: 
  History of  delivery, currently pregnant in third trimester (2018) Overview: 2018 at [facility]:  Patient has a history of  labor in prior  
    pregnancy. She delivered at 25 weeks at [facility] 2015. Baby lived 3 days. She was found to have shortened cervix at regular OB visit at 24 weeks,  
    then went into labor, failed tocolysis. Unclear if cervical  
    insufficiency versus PTL. I explained to patient and  that with  
    this kind of history, 50% of patients may go to term without any  
    treatment. I discussed with her at length about possible cervical  
    incompetence and that at this time we cannot be sure that she will deliver  
    early. In an effort to prevent recurrent  birth, I offered cervical  
    surveillance in pregnancy. If cervical shortening occurs in pregnancy, a  
    cervical cerclage may be placed prior to 24 weeks, alternatively, a  
    vaginal pessary may be used for cervical support. I also offered  
    prophylactic cerclage placement. They do not want any chance of loss again  
    and want cerclage placed now. Cerclage scheduled for 9/10 at 9am. 
     
    See Cervical Insufficiency Problem Overview Active Problems: Hypothyroidism affecting pregnancy in third trimester (2018) Overview:  TSH 4.62, FT4 1.41 
    2018 at [facility]:  Taking synthroid 50mcg daily 10/8/2018 at [facility]:  10/3 TSH 2.610; taking synthroid 50mcg daily. 2018 at [facility]:  Taking Synthroid 75 mcg daily; this dose x ~ 1 month 
    2018 at [facility]:  Synthroid 75mcg daily. 2018 at [facility]:  Taking Synthroid 75 mcg daily. 2019 at [facility]: Taking synthroid 75mcg daily · Recommend evaluation of TSH and FT4 each trimester. Goal to keep TSH  
    <2-2.5. · If medication change, repeat TFTs in 4 weeks. · If poorly controlled thyroid, recommend growth evaluation in 3rd  
    trimester. High-risk pregnancy in third trimester (2018) Overview: 2018 at [facility]:  Normal NT and nasal bone. Declines genetic testing. Had [id number] drawn at [facility] x3, Male fetus. Genetic counseling done by  
    MD. 
    2018 at [facility]:  Normal Anatomy/Fetal Echo with exception of bilateral  
    fetal hydro. 2018 at [facility]:  Appropriate fetal growth. AC 36%, overall 49%, DEMETRIA  
    14.3 cm. See Fetal Hydronephrosis Overview Cervical cerclage suture present in second trimester (2018) Overview: See Cervical Insufficiency Overview Poor fetal growth affecting management of mother in third trimester (2019) Overview: 2019 at [facility]: Placenta [place], reassuring fetal status;  AC 7%,  
    overall 28%, DEMETRIA 13 cm, UA Dopplers WNL, BPP  · Decrease activity and increase calories See Cervical Insufficiency Problem Overview Anxiety in pregnancy in third trimester, antepartum (2019) Overview: Pt's  recently told pt he wanted a divorce-19  Pt upset, very  
    anxious, not sleeping. Pt and husb have 3 children they have fostering  
    and are planning to adopt. 19 at [facility]:  We took pt out of work today due to IUGR and shortened  
    cervix · Note given for pt to come out of work · Seeing counselor next week  · Use benedryl, melatonin or unisom to sleep Pregnancy (2019)  Labor:  48 hour course of steroids to promote fetal lung maturity. Start oral Procardia Neonatology consult Continue hospital bedrest per MFM Cervical Incompetence:  cerclage in place, getting 17 OHP

## 2019-01-26 ENCOUNTER — ANESTHESIA (OUTPATIENT)
Dept: LABOR AND DELIVERY | Age: 24
End: 2019-01-26
Payer: COMMERCIAL

## 2019-01-26 ENCOUNTER — ANESTHESIA EVENT (OUTPATIENT)
Dept: LABOR AND DELIVERY | Age: 24
End: 2019-01-26
Payer: COMMERCIAL

## 2019-01-26 LAB
ARTERIAL PATENCY WRIST A: ABNORMAL
ARTERIAL PATENCY WRIST A: ABNORMAL
BASE DEFICIT BLD-SCNC: 11 MMOL/L
BASE DEFICIT BLD-SCNC: 11 MMOL/L
BDY SITE: ABNORMAL
BDY SITE: ABNORMAL
BODY TEMPERATURE: 98.6
BODY TEMPERATURE: 98.6
CO2 BLD-SCNC: 24 MMOL/L
CO2 BLD-SCNC: 25 MMOL/L
COLLECT TIME,HTIME: 1557
COLLECT TIME,HTIME: 1557
GAS FLOW.O2 O2 DELIVERY SYS: ABNORMAL L/MIN
GAS FLOW.O2 O2 DELIVERY SYS: ABNORMAL L/MIN
HCO3 BLD-SCNC: 22.6 MMOL/L (ref 22–26)
HCO3 BLDV-SCNC: 21.6 MMOL/L (ref 23–28)
PCO2 BLDCO: 78 MMHG (ref 32–68)
PCO2 BLDCO: 88 MMHG (ref 32–68)
PH BLDCO: 7.02 [PH] (ref 7.15–7.38)
PH BLDCO: 7.05 [PH] (ref 7.15–7.38)
PO2 BLDCO: 7 MMHG
PO2 BLDCO: <5 MMHG
SAO2 % BLDV: 4 % (ref 65–88)
SERVICE CMNT-IMP: ABNORMAL
SERVICE CMNT-IMP: ABNORMAL
SPECIMEN TYPE: ABNORMAL
SPECIMEN TYPE: ABNORMAL

## 2019-01-26 PROCEDURE — 76010000391 HC C SECN FIRST 1 HR: Performed by: OBSTETRICS & GYNECOLOGY

## 2019-01-26 PROCEDURE — 77030002974 HC SUT PLN J&J -A: Performed by: OBSTETRICS & GYNECOLOGY

## 2019-01-26 PROCEDURE — 77030032490 HC SLV COMPR SCD KNE COVD -B: Performed by: OBSTETRICS & GYNECOLOGY

## 2019-01-26 PROCEDURE — 77030034696 HC CATH URETH FOL 2W BARD -A: Performed by: OBSTETRICS & GYNECOLOGY

## 2019-01-26 PROCEDURE — 77030039425 HC BLD LARYNG TRULITE DISP TELE -A: Performed by: ANESTHESIOLOGY

## 2019-01-26 PROCEDURE — 88307 TISSUE EXAM BY PATHOLOGIST: CPT

## 2019-01-26 PROCEDURE — 74011000258 HC RX REV CODE- 258

## 2019-01-26 PROCEDURE — 74011250636 HC RX REV CODE- 250/636: Performed by: ANESTHESIOLOGY

## 2019-01-26 PROCEDURE — 76060000033 HC ANESTHESIA 1 TO 1.5 HR: Performed by: OBSTETRICS & GYNECOLOGY

## 2019-01-26 PROCEDURE — 74011250636 HC RX REV CODE- 250/636

## 2019-01-26 PROCEDURE — 65270000029 HC RM PRIVATE

## 2019-01-26 PROCEDURE — 75410000003 HC RECOV DEL/VAG/CSECN EA 0.5 HR: Performed by: OBSTETRICS & GYNECOLOGY

## 2019-01-26 PROCEDURE — 82803 BLOOD GASES ANY COMBINATION: CPT

## 2019-01-26 PROCEDURE — 74011250637 HC RX REV CODE- 250/637: Performed by: OBSTETRICS & GYNECOLOGY

## 2019-01-26 PROCEDURE — 77030018846 HC SOL IRR STRL H20 ICUM -A: Performed by: OBSTETRICS & GYNECOLOGY

## 2019-01-26 PROCEDURE — 74011000250 HC RX REV CODE- 250

## 2019-01-26 PROCEDURE — 77030018836 HC SOL IRR NACL ICUM -A: Performed by: OBSTETRICS & GYNECOLOGY

## 2019-01-26 PROCEDURE — 0UCC7ZZ EXTIRPATION OF MATTER FROM CERVIX, VIA NATURAL OR ARTIFICIAL OPENING: ICD-10-PCS | Performed by: OBSTETRICS & GYNECOLOGY

## 2019-01-26 PROCEDURE — 77030002966 HC SUT PDS J&J -A: Performed by: OBSTETRICS & GYNECOLOGY

## 2019-01-26 PROCEDURE — 77030031139 HC SUT VCRL2 J&J -A: Performed by: OBSTETRICS & GYNECOLOGY

## 2019-01-26 PROCEDURE — 77030002933 HC SUT MCRYL J&J -A: Performed by: OBSTETRICS & GYNECOLOGY

## 2019-01-26 PROCEDURE — 4A1HXCZ MONITORING OF PRODUCTS OF CONCEPTION, CARDIAC RATE, EXTERNAL APPROACH: ICD-10-PCS | Performed by: OBSTETRICS & GYNECOLOGY

## 2019-01-26 PROCEDURE — 77030037088 HC TUBE ENDOTRACH ORAL NSL COVD-A: Performed by: ANESTHESIOLOGY

## 2019-01-26 RX ORDER — KETAMINE HYDROCHLORIDE 100 MG/ML
INJECTION, SOLUTION INTRAMUSCULAR; INTRAVENOUS AS NEEDED
Status: DISCONTINUED | OUTPATIENT
Start: 2019-01-26 | End: 2019-01-26 | Stop reason: HOSPADM

## 2019-01-26 RX ORDER — LABETALOL HCL 20 MG/4 ML
20 SYRINGE (ML) INTRAVENOUS ONCE
Status: ACTIVE | OUTPATIENT
Start: 2019-01-26 | End: 2019-01-27

## 2019-01-26 RX ORDER — PROPOFOL 10 MG/ML
INJECTION, EMULSION INTRAVENOUS AS NEEDED
Status: DISCONTINUED | OUTPATIENT
Start: 2019-01-26 | End: 2019-01-26 | Stop reason: HOSPADM

## 2019-01-26 RX ORDER — SODIUM CHLORIDE 0.9 % (FLUSH) 0.9 %
5-40 SYRINGE (ML) INJECTION EVERY 8 HOURS
Status: DISCONTINUED | OUTPATIENT
Start: 2019-01-26 | End: 2019-01-29 | Stop reason: ALTCHOICE

## 2019-01-26 RX ORDER — OXYCODONE HYDROCHLORIDE 5 MG/1
5 TABLET ORAL
Status: DISCONTINUED | OUTPATIENT
Start: 2019-01-26 | End: 2019-01-29 | Stop reason: HOSPADM

## 2019-01-26 RX ORDER — KETOROLAC TROMETHAMINE 30 MG/ML
INJECTION, SOLUTION INTRAMUSCULAR; INTRAVENOUS AS NEEDED
Status: DISCONTINUED | OUTPATIENT
Start: 2019-01-26 | End: 2019-01-26 | Stop reason: HOSPADM

## 2019-01-26 RX ORDER — KETOROLAC TROMETHAMINE 30 MG/ML
30 INJECTION, SOLUTION INTRAMUSCULAR; INTRAVENOUS EVERY 6 HOURS
Status: DISCONTINUED | OUTPATIENT
Start: 2019-01-26 | End: 2019-01-27

## 2019-01-26 RX ORDER — LOPERAMIDE HYDROCHLORIDE 2 MG/1
4 CAPSULE ORAL
Status: DISCONTINUED | OUTPATIENT
Start: 2019-01-26 | End: 2019-01-29 | Stop reason: HOSPADM

## 2019-01-26 RX ORDER — HYDROMORPHONE HYDROCHLORIDE 2 MG/ML
0.5 INJECTION, SOLUTION INTRAMUSCULAR; INTRAVENOUS; SUBCUTANEOUS
Status: DISCONTINUED | OUTPATIENT
Start: 2019-01-26 | End: 2019-01-27

## 2019-01-26 RX ORDER — OXYCODONE HYDROCHLORIDE 5 MG/1
10 TABLET ORAL
Status: DISCONTINUED | OUTPATIENT
Start: 2019-01-26 | End: 2019-01-29 | Stop reason: HOSPADM

## 2019-01-26 RX ORDER — SIMETHICONE 80 MG
80 TABLET,CHEWABLE ORAL
Status: DISCONTINUED | OUTPATIENT
Start: 2019-01-26 | End: 2019-01-29 | Stop reason: HOSPADM

## 2019-01-26 RX ORDER — OXYTOCIN/RINGER'S LACTATE 30/500 ML
PLASTIC BAG, INJECTION (ML) INTRAVENOUS
Status: DISCONTINUED | OUTPATIENT
Start: 2019-01-26 | End: 2019-01-26 | Stop reason: HOSPADM

## 2019-01-26 RX ORDER — LEVOTHYROXINE SODIUM 50 UG/1
75 TABLET ORAL
Status: DISCONTINUED | OUTPATIENT
Start: 2019-01-27 | End: 2019-01-29 | Stop reason: HOSPADM

## 2019-01-26 RX ORDER — SODIUM CHLORIDE, SODIUM LACTATE, POTASSIUM CHLORIDE, CALCIUM CHLORIDE 600; 310; 30; 20 MG/100ML; MG/100ML; MG/100ML; MG/100ML
50 INJECTION, SOLUTION INTRAVENOUS CONTINUOUS
Status: DISCONTINUED | OUTPATIENT
Start: 2019-01-26 | End: 2019-01-27

## 2019-01-26 RX ORDER — HYDROMORPHONE HYDROCHLORIDE 2 MG/ML
INJECTION, SOLUTION INTRAMUSCULAR; INTRAVENOUS; SUBCUTANEOUS AS NEEDED
Status: DISCONTINUED | OUTPATIENT
Start: 2019-01-26 | End: 2019-01-26 | Stop reason: HOSPADM

## 2019-01-26 RX ORDER — SUCCINYLCHOLINE CHLORIDE 20 MG/ML
INJECTION INTRAMUSCULAR; INTRAVENOUS AS NEEDED
Status: DISCONTINUED | OUTPATIENT
Start: 2019-01-26 | End: 2019-01-26 | Stop reason: HOSPADM

## 2019-01-26 RX ORDER — CLINDAMYCIN PHOSPHATE 900 MG/50ML
900 INJECTION INTRAVENOUS ONCE
Status: DISPENSED | OUTPATIENT
Start: 2019-01-26 | End: 2019-01-27

## 2019-01-26 RX ORDER — LORAZEPAM 1 MG/1
0.5 TABLET ORAL
Status: DISCONTINUED | OUTPATIENT
Start: 2019-01-26 | End: 2019-01-29 | Stop reason: HOSPADM

## 2019-01-26 RX ORDER — OXYCODONE AND ACETAMINOPHEN 7.5; 325 MG/1; MG/1
2 TABLET ORAL
Status: DISCONTINUED | OUTPATIENT
Start: 2019-01-26 | End: 2019-01-29 | Stop reason: HOSPADM

## 2019-01-26 RX ORDER — ALBUTEROL SULFATE 0.83 MG/ML
2.5 SOLUTION RESPIRATORY (INHALATION) AS NEEDED
Status: DISCONTINUED | OUTPATIENT
Start: 2019-01-26 | End: 2019-01-27

## 2019-01-26 RX ORDER — ONDANSETRON 2 MG/ML
INJECTION INTRAMUSCULAR; INTRAVENOUS AS NEEDED
Status: DISCONTINUED | OUTPATIENT
Start: 2019-01-26 | End: 2019-01-26 | Stop reason: HOSPADM

## 2019-01-26 RX ORDER — DIPHENHYDRAMINE HCL 25 MG
25 CAPSULE ORAL
Status: DISCONTINUED | OUTPATIENT
Start: 2019-01-26 | End: 2019-01-29 | Stop reason: HOSPADM

## 2019-01-26 RX ORDER — LABETALOL HCL 20 MG/4 ML
80 SYRINGE (ML) INTRAVENOUS
Status: DISCONTINUED | OUTPATIENT
Start: 2019-01-26 | End: 2019-01-29 | Stop reason: ALTCHOICE

## 2019-01-26 RX ORDER — SODIUM CHLORIDE 0.9 % (FLUSH) 0.9 %
5-40 SYRINGE (ML) INJECTION AS NEEDED
Status: DISCONTINUED | OUTPATIENT
Start: 2019-01-26 | End: 2019-01-29 | Stop reason: ALTCHOICE

## 2019-01-26 RX ORDER — LABETALOL HCL 20 MG/4 ML
40 SYRINGE (ML) INTRAVENOUS
Status: ACTIVE | OUTPATIENT
Start: 2019-01-26 | End: 2019-01-27

## 2019-01-26 RX ORDER — LIDOCAINE HYDROCHLORIDE 20 MG/ML
INJECTION, SOLUTION EPIDURAL; INFILTRATION; INTRACAUDAL; PERINEURAL AS NEEDED
Status: DISCONTINUED | OUTPATIENT
Start: 2019-01-26 | End: 2019-01-26 | Stop reason: HOSPADM

## 2019-01-26 RX ORDER — KETOROLAC TROMETHAMINE 10 MG/1
10 TABLET, FILM COATED ORAL EVERY 6 HOURS
Status: DISCONTINUED | OUTPATIENT
Start: 2019-01-26 | End: 2019-01-29 | Stop reason: HOSPADM

## 2019-01-26 RX ORDER — OXYCODONE AND ACETAMINOPHEN 7.5; 325 MG/1; MG/1
1 TABLET ORAL
Status: DISCONTINUED | OUTPATIENT
Start: 2019-01-26 | End: 2019-01-29 | Stop reason: HOSPADM

## 2019-01-26 RX ORDER — DOCUSATE SODIUM 100 MG/1
100 CAPSULE, LIQUID FILLED ORAL 2 TIMES DAILY
Status: DISCONTINUED | OUTPATIENT
Start: 2019-01-26 | End: 2019-01-29 | Stop reason: HOSPADM

## 2019-01-26 RX ORDER — OXYCODONE HYDROCHLORIDE 5 MG/1
5 TABLET ORAL
Status: DISCONTINUED | OUTPATIENT
Start: 2019-01-26 | End: 2019-01-27

## 2019-01-26 RX ORDER — SODIUM CHLORIDE, SODIUM LACTATE, POTASSIUM CHLORIDE, CALCIUM CHLORIDE 600; 310; 30; 20 MG/100ML; MG/100ML; MG/100ML; MG/100ML
INJECTION, SOLUTION INTRAVENOUS
Status: DISCONTINUED | OUTPATIENT
Start: 2019-01-26 | End: 2019-01-26 | Stop reason: HOSPADM

## 2019-01-26 RX ADMIN — ONDANSETRON 4 MG: 2 INJECTION INTRAMUSCULAR; INTRAVENOUS at 15:59

## 2019-01-26 RX ADMIN — KETAMINE HYDROCHLORIDE 50 MG: 100 INJECTION, SOLUTION INTRAMUSCULAR; INTRAVENOUS at 16:00

## 2019-01-26 RX ADMIN — DOCUSATE SODIUM 100 MG: 100 CAPSULE, LIQUID FILLED ORAL at 22:13

## 2019-01-26 RX ADMIN — SUCCINYLCHOLINE CHLORIDE 180 MG: 20 INJECTION INTRAMUSCULAR; INTRAVENOUS at 15:57

## 2019-01-26 RX ADMIN — PROPOFOL 200 MG: 10 INJECTION, EMULSION INTRAVENOUS at 15:55

## 2019-01-26 RX ADMIN — HYDROMORPHONE HYDROCHLORIDE 2 MG: 2 INJECTION, SOLUTION INTRAMUSCULAR; INTRAVENOUS; SUBCUTANEOUS at 16:00

## 2019-01-26 RX ADMIN — SODIUM CHLORIDE, SODIUM LACTATE, POTASSIUM CHLORIDE, CALCIUM CHLORIDE: 600; 310; 30; 20 INJECTION, SOLUTION INTRAVENOUS at 15:50

## 2019-01-26 RX ADMIN — HYDROMORPHONE HYDROCHLORIDE 1 MG: 2 INJECTION, SOLUTION INTRAMUSCULAR; INTRAVENOUS; SUBCUTANEOUS at 16:44

## 2019-01-26 RX ADMIN — OXYCODONE HYDROCHLORIDE AND ACETAMINOPHEN 1 TABLET: 7.5; 325 TABLET ORAL at 22:13

## 2019-01-26 RX ADMIN — NIFEDIPINE 30 MG: 30 TABLET, FILM COATED, EXTENDED RELEASE ORAL at 01:59

## 2019-01-26 RX ADMIN — SIMETHICONE CHEW TAB 80 MG 80 MG: 80 TABLET ORAL at 22:13

## 2019-01-26 RX ADMIN — KETOROLAC TROMETHAMINE 30 MG: 30 INJECTION, SOLUTION INTRAMUSCULAR; INTRAVENOUS at 16:14

## 2019-01-26 RX ADMIN — Medication 500 ML/HR: at 15:59

## 2019-01-26 RX ADMIN — HYDROMORPHONE HYDROCHLORIDE 1 MG: 2 INJECTION, SOLUTION INTRAMUSCULAR; INTRAVENOUS; SUBCUTANEOUS at 16:16

## 2019-01-26 RX ADMIN — HYDROMORPHONE HYDROCHLORIDE 0.5 MG: 2 INJECTION, SOLUTION INTRAMUSCULAR; INTRAVENOUS; SUBCUTANEOUS at 19:14

## 2019-01-26 RX ADMIN — LORAZEPAM 0.5 MG: 1 TABLET ORAL at 17:46

## 2019-01-26 RX ADMIN — NIFEDIPINE 30 MG: 30 TABLET, FILM COATED, EXTENDED RELEASE ORAL at 14:22

## 2019-01-26 RX ADMIN — LIDOCAINE HYDROCHLORIDE 80 MG: 20 INJECTION, SOLUTION EPIDURAL; INFILTRATION; INTRACAUDAL; PERINEURAL at 15:57

## 2019-01-26 NOTE — OP NOTES
Chapito Gloria  [id number]      INTRAUTERINE PREGNANCY  SECTION FULL OP NOTE        DATE OF PROCEDURE:  2019    PREOPERATIVE DIAGNOSIS:  pregnancy , 32 weeks,  labor, repetitive decelerations    POSTOPERATIVE DIAGNOSIS:  C/section, same as above      PROCEDURE: Intrauterine pregnancy,  section    SURGEON:  Jaswinder Royal MD, Danay Rodriguez MD      ANESTHESIA: general  EBL: 923 cc    COMPLICATIONS: none    INDICATIONS: called to see patient for repetitive decelerations. Has been on prolonged monitoring due to nonreactive nst, was hospitalized for  labor. rescusitative measures of o2, position, and iv fluid bolus were initiated with no improvement. mhr was documented via pulse ox for differentiation and fhr visualized for confirmation. Decelerations to the 70s-80s for up to 3 minutes continued. Decision was made to proceed directly to or for emergent delivery. OPERATIVE PROCEDURE: Patient was placed on the operating room table in the supine position/left lateral tilt and fetal heart tones verified. The abdomen was prpped and the patient was draped. Time out was done to confirm the operating procedure, surgeon, patient and site. Once confirmed by the team, procedure was started. General anesthesia was initiated. A Pfannenstiel incision was made and carried down sharply through the skin, subcutaneous tissue, and fascia. The incision was then extended in Cristopher-Pizarro fashion. The peritoneum was  Entered bluntly. The DeLee bladder blade was then placed over the symphisis pubis. The visceroperitoneal reflection over the lower uterine segment was incised transversely and the bladder flap sharply and bluntly developed. The uterus was incised transversely, then extended bluntly. The placenta was anterior and low on the anterior uterine wall. Rather than going through the placenta going behind the placenta caudad allowed access to the amniotic sac which was ruptured bluntly.  The the infants head was delivered without difficulty with fundal pressure. Fluid was clear. Cord was clamped and cut. Mouth and nose were suctioned clean. The infant was given to the NICU physician present at the time of delivery. The uterus was massaged and the  placenta was expressed. The uterus was exteriorized, wrapped in a wet lap square, curetted with a dry square, and closed in a double-layered fashion with #1 vicryl then 0-monocryl. Hemostasis appeared adequate. The cul-de-sac was then irrigated and suctioned clean. The uterus was placed in the abdomen. The gutters were irrigated and suctioned clean. The peritoneum/rectus muscles were closed with 2-0 vicryl. The fascia was closed with 0 PDS. Running 2-0 vicryl  was used to reapproximate the subcutaneous tissue incorporating Rahul's and Camper's fascia. 3-0 vicryl suture was used in a subcuticular stich and a dressing was placed. The patient tolerated the procedure well and went to the recovery room in satisfactory condition. Counts were correct at the completion of the procedure. Patient received prophylactic antibiotics, iv pitocin after delivery of the placenta. Lashell Womack MD

## 2019-01-26 NOTE — L&D DELIVERY NOTE
Delivery Note    Obstetrician:  João Cross MD    Assistant: Dr. Stacie Sharma    Pre-Delivery Diagnosis:  pregnancy <37 weeks, Single fetus, Pregnancy complicated by: incompetent cervix, h/o PTD, and  labor and fetal bradycardia    Post-Delivery Diagnosis: Living  infant(s) and Male    Intrapartum Event: Extended fetal bradycardia    Procedure: Primary Low Transverse  section    Delivery Summary    Patient: Lisseth Christie MRN: [id number]  SSN: [id number]    YOB: 1995  Age: 24 y.o. Sex: female        Information for the patient's :  Ynes Palmer [[id number]]       Labor Events:    Labor:     Rupture Date:     Rupture Time:     Rupture Type Intact   Amniotic Fluid Volume: Moderate    Amniotic Fluid Description:       Induction: None       Augmentation: None   Labor Events: None     Cervical Ripening:     None     Delivery Events:  Episiotomy: None   Laceration(s): None     Repaired:      Number of Repair Packets:     Suture Type and Size:       Estimated Blood Loss (ml): 800.00ml       Delivery Date: 2019    Delivery Time: 3:57 PM  Delivery Type: , Low Transverse   Details    Trial of Labor: No   Primary/Repeat: Primary   Priority: Emergency   Indications: Other (Add Comments)   Incision type:     Sex:  Male     Gestational Age: 35w2d  Delivery Clinician:     Living Status: Living   Delivery Location: OR            APGARS  One minute Five minutes Ten minutes   Skin color: 0   1        Heart rate: 2   2        Grimace: 2   2        Muscle tone: 1   1        Breathin   2        Totals: 7   8          Presentation: Vertex    Position:        Resuscitation Method:  Suctioning-bulb     Meconium Stained: None      Cord Information: 3 Vessels  Complications: Prolapsed;Nuchal Cord With Compressions  Cord around:    Delayed cord clamping?  No  Cord clamped date/time:   Disposition of Cord Blood:      Blood Gases Sent?: Yes    Placenta:  Date/Time: 2019  3:59 PM  Removal: Manual Removal      Appearance: Normal;Intact      Measurements:  Birth Weight: 1.59 kg      Birth Length:        Head Circumference:        Chest Circumference:       Abdominal Girth:       Other Providers:   Jerome VINSON;DALTON CLARK;;;CATHERINE SHAY;Kasi REED EMILY W, Obstetrician;Primary Nurse;Primary [place] Nurse;Nicu Nurse;Neonatologist;Anesthesiologist;Crna             Group B Strep: No results found for: GRBSEXT, GRBSEXT  Information for the patient's :  Johanna Hendrix [[id number]]   No results found for: Kelvin Liangyumiko Abdulkadir Shmuel, PCTABR, PCTDIG, BILI, ABORHEXT, ABORH    Recent Labs     19  1633 19  1626   PCO2CB 88* 78*   PO2CB <5 7   HCO3I 22.6  --    IBD 11 11   PTEMPI 98.6 98.6   SPECTI ARTERIAL CORD VENOUS CORD   PHICB 7.019* 7.052*   ISITE CORD CORD   IDEV ROOM AIR ROOM AIR   IALLEN NOT APPLICABLE NOT APPLICABLE          Specimens: placenta           Complications:  fetal distress           Cord Blood Results:   Information for the patient's :  Johanna Hendrix [[id number]]   No results found for: PCTABR, PCTDIG, BILI, 82 Garthyumiko Abdulkadir Mi    Prenatal Labs:     Lab Results   Component Value Date/Time    ABO/Rh(D) AB POSITIVE 2015 11:55 AM    HBsAg, External neg 2018    HIV, External non reactive 2018    Rubella, External non-immune 2018    RPR, External non-reactive 2018      See dictation #[id number]    Attending Attestation: I was present and scrubbed for the entire procedure    Signed By:  Jennifer Duncan MD     2019

## 2019-01-26 NOTE — PROGRESS NOTES
Updated Dr Viki Reynolds that the strip is not reactive but there are no decels. Will continue to evaluate. Dr Viki Reynolds acknowledge information.

## 2019-01-26 NOTE — PROGRESS NOTES
Ante Partum High Risk Pregnancy Note Patient admitted for cervical incompetence and  labor states she does not  have  abdominal pain  , contractions, vaginal bleeding , swelling and vaginal leaking of fluid . LOS:   
Vitals: Temp (24hrs), Av °F (36.7 °C), Min:97.7 °F (36.5 °C), Max:98.1 °F (36.7 °C) Patient Vitals for the past 24 hrs: 
 BP  
19 0757 117/72  
19 2214 113/85  
19 1653 126/78  
19 1405 128/87  
19 1402 128/87 I&O:   No intake/output data recorded. No intake/output data recorded. Exam:  Patient without distress. Abdomen: soft, non-tender Fundus: soft and non tender Fundal Height:  cm 
             Right Upper Quadrant: non-tender Perineum: No sign of blood or amniotic fluid Lower Extremities: No 
             Patellar Reflexes: 1+ bilaterally Clonus: absent Fetal Monitoring:  Accelerations: Reactive Uterine Activity: None NST:  reactive Lab/Data Review: All lab results for the last 24 hours reviewed. Assessment and Plan:   
 
Principal Problem: 
  History of  delivery, currently pregnant in third trimester (2018) Overview: 2018 at [facility]:  Patient has a history of  labor in prior  
    pregnancy. She delivered at 25 weeks at [address] 2015. Baby lived 3 days. She was found to have shortened cervix at regular OB visit at 24 weeks,  
    then went into labor, failed tocolysis. Unclear if cervical  
    insufficiency versus PTL. I explained to patient and  that with  
    this kind of history, 50% of patients may go to term without any  
    treatment. I discussed with her at length about possible cervical  
    incompetence and that at this time we cannot be sure that she will deliver  
    early.   
     
    In an effort to prevent recurrent  birth, I offered cervical  
 surveillance in pregnancy. If cervical shortening occurs in pregnancy, a  
    cervical cerclage may be placed prior to 24 weeks, alternatively, a  
    vaginal pessary may be used for cervical support. I also offered  
    prophylactic cerclage placement. They do not want any chance of loss again  
    and want cerclage placed now. Cerclage scheduled for 9/10 at 9am. 
     
    See Cervical Insufficiency Problem Overview Active Problems: Hypothyroidism affecting pregnancy in third trimester (9/6/2018) Overview: 8/1 TSH 4.62, FT4 1.41 
    9/6/2018 at [facility]:  Taking synthroid 50mcg daily 10/8/2018 at [facility]:  10/3 TSH 2.610; taking synthroid 50mcg daily. 11/14/2018 at [facility]:  Taking Synthroid 75 mcg daily; this dose x ~ 1 month 
    11/28/2018 at [facility]:  Synthroid 75mcg daily. 12/19/2018 at [facility]:  Taking Synthroid 75 mcg daily. 1/16/2019 at [facility]: Taking synthroid 75mcg daily · Recommend evaluation of TSH and FT4 each trimester. Goal to keep TSH  
    <2-2.5. · If medication change, repeat TFTs in 4 weeks. · If poorly controlled thyroid, recommend growth evaluation in 3rd  
    trimester. High-risk pregnancy in third trimester (11/14/2018) Overview: 9/6/2018 at [facility]:  Normal NT and nasal bone. Declines genetic testing. Had [id number] drawn at [facility] x3, Male fetus. Genetic counseling done by  
    MD. 
    11/14/2018 at [facility]:  Normal Anatomy/Fetal Echo with exception of bilateral  
    fetal hydro. 12/19/2018 at [facility]:  Appropriate fetal growth. AC 36%, overall 49%, DEMETRIA  
    14.3 cm. See Fetal Hydronephrosis Overview Cervical cerclage suture present in second trimester (11/28/2018) Overview: See Cervical Insufficiency Overview Poor fetal growth affecting management of mother in third trimester (1/16/2019)     Overview:  
    1/16/2019 at [facility]: Placenta [place], reassuring fetal status;  AC 7%,  
 overall 28%, DEMETRIA 13 cm, UA Dopplers WNL, BPP / · Decrease activity and increase calories See Cervical Insufficiency Problem Overview Anxiety in pregnancy in third trimester, antepartum (2019) Overview: Pt's  recently told pt he wanted a divorce-19  Pt upset, very  
    anxious, not sleeping. Pt and husb have 3 children they have fostering  
    and are planning to adopt. 19 at [facility]:  We took pt out of work today due to IUGR and shortened  
    cervix · Note given for pt to come out of work · Seeing counselor next week  · Use benedryl, melatonin or unisom to sleep Pregnancy (2019)  Labor:  48 hour course of steroids to promote fetal lung maturity. Start oral Procardia Neonatology consult 
continue hospital stay per MFM due to cerclage, and +FFN with h./o PTD and demise Cervical Incompetence:  cerclage stable

## 2019-01-26 NOTE — ANESTHESIA PREPROCEDURE EVALUATION
Anesthetic History No history of anesthetic complications Review of Systems / Medical History Patient summary reviewed, nursing notes reviewed and pertinent labs reviewed Pulmonary Asthma : well controlled Neuro/Psych Within defined limits Cardiovascular Exercise tolerance: >4 METS 
  
GI/Hepatic/Renal 
  
 
 
 
 
 
 Endo/Other Hypothyroidism: well controlled Morbid obesity Other Findings Comments: Multiple allergies- reports anaphylaxis to PCN and Morphine Not NPO Physical Exam 
 
Airway Mallampati: II 
TM Distance: 4 - 6 cm Neck ROM: normal range of motion Mouth opening: Normal 
 
 Cardiovascular Rhythm: regular Dental 
No notable dental hx Pulmonary Breath sounds clear to auscultation Abdominal 
GI exam deferred Other Findings Anesthetic Plan ASA: 3, emergent Anesthesia type: general 
 
 
 
 
Induction: Intravenous and RSI Anesthetic plan and risks discussed with: Patient

## 2019-01-27 LAB
HCT VFR BLD AUTO: 33.2 % (ref 35.8–46.3)
HGB BLD-MCNC: 11.5 G/DL (ref 11.7–15.4)

## 2019-01-27 PROCEDURE — 65270000029 HC RM PRIVATE

## 2019-01-27 PROCEDURE — 36415 COLL VENOUS BLD VENIPUNCTURE: CPT

## 2019-01-27 PROCEDURE — 74011250636 HC RX REV CODE- 250/636: Performed by: OBSTETRICS & GYNECOLOGY

## 2019-01-27 PROCEDURE — 74011250637 HC RX REV CODE- 250/637: Performed by: OBSTETRICS & GYNECOLOGY

## 2019-01-27 PROCEDURE — 85018 HEMOGLOBIN: CPT

## 2019-01-27 RX ADMIN — SIMETHICONE CHEW TAB 80 MG 80 MG: 80 TABLET ORAL at 08:41

## 2019-01-27 RX ADMIN — KETOROLAC TROMETHAMINE 30 MG: 30 INJECTION, SOLUTION INTRAMUSCULAR at 01:18

## 2019-01-27 RX ADMIN — OXYCODONE HYDROCHLORIDE 10 MG: 5 TABLET ORAL at 17:23

## 2019-01-27 RX ADMIN — LEVOTHYROXINE SODIUM 75 MCG: 50 TABLET ORAL at 07:11

## 2019-01-27 RX ADMIN — OXYCODONE HYDROCHLORIDE AND ACETAMINOPHEN 2 TABLET: 7.5; 325 TABLET ORAL at 12:52

## 2019-01-27 RX ADMIN — DOCUSATE SODIUM 100 MG: 100 CAPSULE, LIQUID FILLED ORAL at 08:41

## 2019-01-27 RX ADMIN — OXYCODONE HYDROCHLORIDE AND ACETAMINOPHEN 2 TABLET: 7.5; 325 TABLET ORAL at 05:19

## 2019-01-27 RX ADMIN — KETOROLAC TROMETHAMINE 10 MG: 10 TABLET, FILM COATED ORAL at 14:27

## 2019-01-27 RX ADMIN — OXYCODONE HYDROCHLORIDE AND ACETAMINOPHEN 2 TABLET: 7.5; 325 TABLET ORAL at 01:47

## 2019-01-27 RX ADMIN — SIMETHICONE CHEW TAB 80 MG 80 MG: 80 TABLET ORAL at 17:23

## 2019-01-27 RX ADMIN — DOCUSATE SODIUM 100 MG: 100 CAPSULE, LIQUID FILLED ORAL at 17:23

## 2019-01-27 RX ADMIN — OXYCODONE HYDROCHLORIDE AND ACETAMINOPHEN 2 TABLET: 7.5; 325 TABLET ORAL at 23:38

## 2019-01-27 RX ADMIN — SIMETHICONE CHEW TAB 80 MG 80 MG: 80 TABLET ORAL at 22:00

## 2019-01-27 RX ADMIN — SIMETHICONE CHEW TAB 80 MG 80 MG: 80 TABLET ORAL at 12:52

## 2019-01-27 RX ADMIN — KETOROLAC TROMETHAMINE 10 MG: 10 TABLET, FILM COATED ORAL at 19:57

## 2019-01-27 RX ADMIN — KETOROLAC TROMETHAMINE 30 MG: 30 INJECTION, SOLUTION INTRAMUSCULAR at 08:41

## 2019-01-27 NOTE — PROGRESS NOTES
SBAR OUT Report: Mother Verbal report given to Lang Ford RN (full name & credentials) on this patient, who is now being transferred to MIU (unit) for routine progression of care. The patient is not wearing a green \"Anesthesia-Duramorph\" band. Report consisted of patient's Situation, Background, Assessment and Recommendations (SBAR). [place] ID bands were compared with the identification form, and verified with the patient and receiving nurse. Information from the SBAR and the [facility] Report was reviewed with the receiving nurse; opportunity for questions and clarification provided.

## 2019-01-27 NOTE — PROGRESS NOTES
Post-Operative Day Number 1 Progress Note Patient doing well post-op day 1 from  delivery without significant complaints. Pain controlled on current medication. Voiding without difficulty, normal lochia. Vitals:   
Patient Vitals for the past 8 hrs: 
 BP Temp Pulse Resp SpO2  
19 0752 128/72 98.5 °F (36.9 °C) 72 18 95 % 19 0621 109/63 98.1 °F (36.7 °C) 72 16 96 % 19 0145 127/74 98.2 °F (36.8 °C) 70 16 96 % Temp (24hrs), Av.1 °F (36.7 °C), Min:97.7 °F (36.5 °C), Max:98.5 °F (36.9 °C) Vital signs stable, afebrile. Exam:  Patient without distress. Abdomen soft, fundus firm at level of umbilicus, non tender. Incision dry and clean without erythema. Lower extremities are negative for swelling, cords or tenderness. Lab/Data Review: CBC:  
Lab Results Component Value Date/Time HGB 11.5 (L) 2019 06:39 AM  
 HCT 33.2 (L) 2019 06:39 AM  
 
 
Assessment and Plan:  Patient appears to be having uncomplicated post- course. Continue routine post-op care and maternal education.

## 2019-01-27 NOTE — PROGRESS NOTES
SBAR IN Report: Mother Verbal report received from Vadim Tinajero RN (full name & credentials) on this patient, who is now being transferred from Labor and Delivery (unit) for routine progression of care. The patient is not wearing a green \"Anesthesia-Duramorph\" band. Report consisted of patient's Situation, Background, Assessment and Recommendations (SBAR).  ID bands were compared with the identification form, and verified with the patient and transferring nurse. Information from the SBAR and the [place] Report was reviewed with the transferring nurse; opportunity for questions and clarification provided.

## 2019-01-27 NOTE — PROGRESS NOTES
Pt assisted up to BR and yanique care was taught and pt then void and did self yanique care without difficulty. Back to bed with no distress

## 2019-01-27 NOTE — PROGRESS NOTES
Called Dr. Tushar Templeton for an order clarification on the patient Procardia order. He stated to discontinue the procardia

## 2019-01-27 NOTE — PROGRESS NOTES
[facility] Dr Pearl Mckinnon called and updated. Request for Dr Radha Moser to come to bedside. [address] [facility] [address] Radha Moser called 1538 pt in trendelenburg. [place] 1 Dr Radha Moser at bedside. Ultrasound. 1543 on phone with Dr Essie Stokes. 1546 To Or 
1550 in or.  
1555 start 1557 Delivery.

## 2019-01-27 NOTE — PROGRESS NOTES
1455 O2 pt turned to left side [id number] texted Dr Grupo Mejia that pt has had 2 decels. 1500 Dr Dr Grupo Mejia on phone. Orders to start Lr and give IVB of a liter.

## 2019-01-27 NOTE — OP NOTES
[facility] REPORT    Paulino Eldridge  MR#: [id number]  : 1995  ACCOUNT #: [de-identified]   DATE OF SERVICE: 2019    PREOPERATIVE DIAGNOSES:  A 32-3/7 weeks' intrauterine pregnancy with prolonged fetal bradycardia for proceeding for emergency  section; pregnancy complicated by incompetent cervix,  labor with recent positive fetal fibronectin, admitted for steroid use and bed rest.    POSTOPERATIVE DIAGNOSES:  A 32-3/7 weeks' intrauterine pregnancy with prolonged fetal bradycardia for proceeding for emergency  section; pregnancy complicated by incompetent cervix,  labor with recent positive fetal fibronectin, admitted for steroid use and bed rest; with an operative delivery at 3:57 p.m. on 2019 with the delivery of a male infant with Apgars 7 and 8, weighing 1.59 kg to special care nursery. PROCEDURE PERFORMED:  Emergency low transverse  section. SURGEON:  Tl Rubio MD    ASSISTANT:  Josee Brenner MD    ANESTHESIA:  General.    ESTIMATED BLOOD LOSS:  700 mL. DRAINS:  Arriaga. SPECIMENS REMOVED:  Placenta. COMPLICATIONS:  None. IMPLANTS:      FINDINGS:  Upon getting inside, the cord was down below the head, possibly a cord obstruction occurring. There was no rupture of membranes. No other major abnormalities found. There was sufficient amniotic fluid and no sign of infection. Otherwise, there was a very small uterus, even for 32 weeks, but the tubes and ovaries appeared normal.    PROCEDURE:  The patient was emergently taken to the OR and general anesthetic administered. Dr. Vira Shrestha, being in house, started the  section, made the Pfannenstiel skin incision and carried it down to the fascia, nicked and extended it bilaterally and bluntly entered the peritoneal cavity. A bladder flap created.   Hysterotomy incision was made and the infant was quickly delivered onto the abdomen and bulb suctioned while cord was then clamped and cut. The infant was passed off to the awaiting attendants who awarded the above Apgars. The placenta was then extracted. Uterus exteriorized and wrapped in a pad and dry curetted. I had arrived at this point and then the uterine incision was then closed in 2 layers, the first a running locking and second an imbricating with good hemostasis. The cul-de-sac was irrigated, clots removed. The uterus returned to its original place on abdominal cavity. Both paracolic gutters were irrigated and clots removed. Anterior peritoneum was closed with 2-0 Monocryl in a running fashion. The rectus muscle was irrigated. Bleeding controlled with electrocautery. Reapproximate the fascia with a 0 PDS in a running fashion x2. Subcutaneous tissues were irrigated. Bleeding controlled with electrocautery. Reapproximated with 2-0 plain and skin was closed with subcuticular 4-0 Monocryl. All sponge and instrument counts were correct x2. Attention was then drawn below vaginally where the cerclage was then identified and it was then excised and removed in its entirety with good hemostasis from the site. Patient then went to the recovery room for the general anesthesia, the infant with the special care nursery for care with Neonatology.         Ju Guzman MD MLS / Keli.Han  D: 01/26/2019 17:18     T: 01/27/2019 06:35  JOB #: [id number]  CC: Brooke Lehman [address]159  [address]

## 2019-01-27 NOTE — PROGRESS NOTES
Arriaga catheter removed per protocol. Dressing removed. Pt up to side of bed. Unable to fully stand at this time related to pain. Pt requests to try again after next dose of pain medicine. Pt assisted back to bed. Temi-care performed. Resting quietly at this time. Instructed to call for needs or concerns.

## 2019-01-27 NOTE — ANESTHESIA POSTPROCEDURE EVALUATION
Procedure(s):  SECTION CERCLAGE CERVICAL REMOVAL. Anesthesia Post Evaluation Multimodal analgesia: multimodal analgesia used between 6 hours prior to anesthesia start to PACU discharge Patient location during evaluation: bedside Patient participation: complete - patient participated Level of consciousness: awake Pain management: adequate Airway patency: patent Anesthetic complications: no 
Cardiovascular status: acceptable Respiratory status: nonlabored ventilation and acceptable Hydration status: acceptable Post anesthesia nausea and vomiting:  none Visit Vitals BP (!) 155/95 Pulse 64 Temp 36.7 °C (98 °F) Resp 16 SpO2 93% Breastfeeding?  No

## 2019-01-27 NOTE — PROGRESS NOTES
1526 fetal heart tone are in the 90's to 110's placed o2 sat monitor on to check for maternal pulse. o 2 cont. Via non rebreather mask.  Pt to right lateral than 2 min later to left lateral.

## 2019-01-27 NOTE — PROGRESS NOTES
Called Dr. Mone Tony and reviewed patients blood pressures in recovery. Dr. Mone Tony ordered the Labetalol protocol and ordered that the patient could be moved to the MIU unit.

## 2019-01-28 LAB
APPEARANCE UR: ABNORMAL
BACTERIA URNS QL MICRO: 0 /HPF
BASOPHILS # BLD: 0 K/UL (ref 0–0.2)
BASOPHILS NFR BLD: 0 % (ref 0–2)
BILIRUB UR QL: NEGATIVE
COLOR UR: YELLOW
DIFFERENTIAL METHOD BLD: ABNORMAL
EOSINOPHIL # BLD: 0 K/UL (ref 0–0.8)
EOSINOPHIL NFR BLD: 0 % (ref 0.5–7.8)
EPI CELLS #/AREA URNS HPF: ABNORMAL /HPF
ERYTHROCYTE [DISTWIDTH] IN BLOOD BY AUTOMATED COUNT: 13.4 % (ref 11.9–14.6)
FLUAV AG NPH QL IA: NEGATIVE
FLUBV AG NPH QL IA: NEGATIVE
GLUCOSE UR STRIP.AUTO-MCNC: NEGATIVE MG/DL
HCT VFR BLD AUTO: 31.8 % (ref 35.8–46.3)
HGB BLD-MCNC: 11.2 G/DL (ref 11.7–15.4)
HGB UR QL STRIP: ABNORMAL
IMM GRANULOCYTES # BLD AUTO: 0.1 K/UL (ref 0–0.5)
IMM GRANULOCYTES NFR BLD AUTO: 1 % (ref 0–5)
KETONES UR QL STRIP.AUTO: NEGATIVE MG/DL
LEUKOCYTE ESTERASE UR QL STRIP.AUTO: ABNORMAL
LYMPHOCYTES # BLD: 1.4 K/UL (ref 0.5–4.6)
LYMPHOCYTES NFR BLD: 11 % (ref 13–44)
MCH RBC QN AUTO: 32.2 PG (ref 26.1–32.9)
MCHC RBC AUTO-ENTMCNC: 35.2 G/DL (ref 31.4–35)
MCV RBC AUTO: 91.4 FL (ref 79.6–97.8)
MONOCYTES # BLD: 0.8 K/UL (ref 0.1–1.3)
MONOCYTES NFR BLD: 7 % (ref 4–12)
NEUTS SEG # BLD: 10.4 K/UL (ref 1.7–8.2)
NEUTS SEG NFR BLD: 81 % (ref 43–78)
NITRITE UR QL STRIP.AUTO: NEGATIVE
NRBC # BLD: 0 K/UL (ref 0–0.2)
PH UR STRIP: 6 [PH] (ref 5–9)
PLATELET # BLD AUTO: 157 K/UL (ref 150–450)
PMV BLD AUTO: 12.1 FL (ref 9.4–12.3)
PROT UR STRIP-MCNC: 100 MG/DL
RBC # BLD AUTO: 3.48 M/UL (ref 4.05–5.2)
RBC #/AREA URNS HPF: >100 /HPF
SP GR UR REFRACTOMETRY: 1.01 (ref 1–1.02)
SPECIMEN SOURCE: NORMAL
UROBILINOGEN UR QL STRIP.AUTO: 0.2 EU/DL (ref 0.2–1)
WBC # BLD AUTO: 12.8 K/UL (ref 4.3–11.1)
WBC URNS QL MICRO: >100 /HPF

## 2019-01-28 PROCEDURE — 65270000029 HC RM PRIVATE

## 2019-01-28 PROCEDURE — 81001 URINALYSIS AUTO W/SCOPE: CPT

## 2019-01-28 PROCEDURE — 87186 SC STD MICRODIL/AGAR DIL: CPT

## 2019-01-28 PROCEDURE — 85025 COMPLETE CBC W/AUTO DIFF WBC: CPT

## 2019-01-28 PROCEDURE — 87088 URINE BACTERIA CULTURE: CPT

## 2019-01-28 PROCEDURE — 87086 URINE CULTURE/COLONY COUNT: CPT

## 2019-01-28 PROCEDURE — 87804 INFLUENZA ASSAY W/OPTIC: CPT

## 2019-01-28 PROCEDURE — 74011250637 HC RX REV CODE- 250/637: Performed by: OBSTETRICS & GYNECOLOGY

## 2019-01-28 PROCEDURE — 36415 COLL VENOUS BLD VENIPUNCTURE: CPT

## 2019-01-28 RX ORDER — ESCITALOPRAM OXALATE 10 MG/1
10 TABLET ORAL DAILY
Status: DISCONTINUED | OUTPATIENT
Start: 2019-01-28 | End: 2019-01-29 | Stop reason: HOSPADM

## 2019-01-28 RX ADMIN — KETOROLAC TROMETHAMINE 10 MG: 10 TABLET, FILM COATED ORAL at 02:31

## 2019-01-28 RX ADMIN — OXYCODONE HYDROCHLORIDE AND ACETAMINOPHEN 2 TABLET: 7.5; 325 TABLET ORAL at 18:01

## 2019-01-28 RX ADMIN — KETOROLAC TROMETHAMINE 10 MG: 10 TABLET, FILM COATED ORAL at 08:23

## 2019-01-28 RX ADMIN — LEVOTHYROXINE SODIUM 75 MCG: 50 TABLET ORAL at 07:22

## 2019-01-28 RX ADMIN — SIMETHICONE CHEW TAB 80 MG 80 MG: 80 TABLET ORAL at 22:36

## 2019-01-28 RX ADMIN — DOCUSATE SODIUM 100 MG: 100 CAPSULE, LIQUID FILLED ORAL at 08:23

## 2019-01-28 RX ADMIN — KETOROLAC TROMETHAMINE 10 MG: 10 TABLET, FILM COATED ORAL at 21:02

## 2019-01-28 RX ADMIN — SIMETHICONE CHEW TAB 80 MG 80 MG: 80 TABLET ORAL at 07:23

## 2019-01-28 RX ADMIN — SIMETHICONE CHEW TAB 80 MG 80 MG: 80 TABLET ORAL at 12:49

## 2019-01-28 RX ADMIN — SIMETHICONE CHEW TAB 80 MG 80 MG: 80 TABLET ORAL at 18:01

## 2019-01-28 RX ADMIN — OXYCODONE HYDROCHLORIDE AND ACETAMINOPHEN 1 TABLET: 7.5; 325 TABLET ORAL at 08:23

## 2019-01-28 RX ADMIN — OXYCODONE HYDROCHLORIDE AND ACETAMINOPHEN 2 TABLET: 7.5; 325 TABLET ORAL at 03:41

## 2019-01-28 RX ADMIN — DIPHENHYDRAMINE HYDROCHLORIDE 25 MG: 25 CAPSULE ORAL at 22:36

## 2019-01-28 RX ADMIN — OXYCODONE HYDROCHLORIDE AND ACETAMINOPHEN 2 TABLET: 7.5; 325 TABLET ORAL at 12:51

## 2019-01-28 RX ADMIN — DOCUSATE SODIUM 100 MG: 100 CAPSULE, LIQUID FILLED ORAL at 18:01

## 2019-01-28 RX ADMIN — ESCITALOPRAM OXALATE 10 MG: 10 TABLET ORAL at 10:32

## 2019-01-28 RX ADMIN — KETOROLAC TROMETHAMINE 10 MG: 10 TABLET, FILM COATED ORAL at 15:18

## 2019-01-28 NOTE — PROGRESS NOTES
Post-Operative Day Number 2 Progress Note Patient doing well post-op day 2 from  delivery without significant complaints. Pain controlled on current medication. Voiding without difficulty, normal lochia. Baby in SCN- 32 week delivery. He is on nasal cannula now. She has a complex social situation-  is having an affair. She is tearful. Would like to start medication for her mood. Can't sleep well. Vitals:   
Patient Vitals for the past 8 hrs: 
 BP Temp Pulse Resp SpO2  
19 0656 129/80 98 °F (36.7 °C) 69 16 96 % 19 0545 122/80 97.8 °F (36.6 °C) 73 16 96 % Temp (24hrs), Av.2 °F (36.8 °C), Min:97.8 °F (36.6 °C), Max:98.8 °F (37.1 °C) Vital signs stable, afebrile. Exam:  Patient without distress. Abdomen soft, fundus firm at level of umbilicus, non tender. Incision dry and clean without erythema. Lower extremities are negative for swelling, cords or tenderness. Lab/Data Review: All lab results for the last 24 hours reviewed. Assessment and Plan:  Patient appears to be having uncomplicated post- course. Continue routine post-op care and maternal education. Depression/anxiety- start Lexapro Baby in [facility] Elevated BP yesterday- normal today. Will continue to monitor

## 2019-01-28 NOTE — PROGRESS NOTES
Shift assessment complete as noted. Patient resting comfortably. Questions encouraged and answered. Encouraged to call for needs or concerns. Verbalizes understanding.

## 2019-01-28 NOTE — PROGRESS NOTES
Attempted to meet with patient - patient sleeping and agreeable for  to come back later. Angela Bryan, [address] [facility]

## 2019-01-28 NOTE — PROGRESS NOTES
Dr Ana Laura Price at bedside to assess patient. MD notified pt states she tripped over blanket and fell into couch. No other orders received.

## 2019-01-28 NOTE — PROGRESS NOTES
01/28/19 1805 Pain Assessment Pain Scale 1 Numeric (0 - 10) Pain Intensity 1 5 Pain Location 1 Incisional  
Pain Orientation 1 Lower Pain Description 1 Sore Pain Intervention(s) 1 Medication (see MAR)

## 2019-01-29 VITALS
TEMPERATURE: 98.6 F | DIASTOLIC BLOOD PRESSURE: 85 MMHG | OXYGEN SATURATION: 98 % | RESPIRATION RATE: 16 BRPM | HEART RATE: 96 BPM | SYSTOLIC BLOOD PRESSURE: 124 MMHG

## 2019-01-29 PROCEDURE — 74011250637 HC RX REV CODE- 250/637: Performed by: OBSTETRICS & GYNECOLOGY

## 2019-01-29 RX ORDER — IBUPROFEN 600 MG/1
600 TABLET ORAL
Qty: 20 TAB | Refills: 1 | Status: SHIPPED | OUTPATIENT
Start: 2019-01-29

## 2019-01-29 RX ORDER — ESCITALOPRAM OXALATE 10 MG/1
10 TABLET ORAL DAILY
Qty: 30 TAB | Refills: 3 | Status: SHIPPED | OUTPATIENT
Start: 2019-01-30

## 2019-01-29 RX ORDER — LORAZEPAM 1 MG/1
.5-1 TABLET ORAL
Qty: 20 TAB | Refills: 0 | Status: SHIPPED | OUTPATIENT
Start: 2019-01-29

## 2019-01-29 RX ORDER — OXYCODONE AND ACETAMINOPHEN 7.5; 325 MG/1; MG/1
1-2 TABLET ORAL
Qty: 30 TAB | Refills: 0 | Status: SHIPPED | OUTPATIENT
Start: 2019-01-29

## 2019-01-29 RX ADMIN — LEVOTHYROXINE SODIUM 75 MCG: 50 TABLET ORAL at 07:08

## 2019-01-29 RX ADMIN — SIMETHICONE CHEW TAB 80 MG 80 MG: 80 TABLET ORAL at 08:58

## 2019-01-29 RX ADMIN — KETOROLAC TROMETHAMINE 10 MG: 10 TABLET, FILM COATED ORAL at 07:08

## 2019-01-29 RX ADMIN — ESCITALOPRAM OXALATE 10 MG: 10 TABLET ORAL at 08:58

## 2019-01-29 RX ADMIN — DOCUSATE SODIUM 100 MG: 100 CAPSULE, LIQUID FILLED ORAL at 08:58

## 2019-01-29 NOTE — DISCHARGE INSTRUCTIONS
Patient Education         Section: What to Expect at Home  Your Recovery    A  section, or , is surgery to deliver your baby through a cut, called an incision, that the doctor makes in your lower belly and uterus. You may have some pain in your lower belly and need pain medicine for 1 to 2 weeks. You can expect some vaginal bleeding for several weeks. You will probably need about 6 weeks to fully recover. It is important to take it easy while the incision is healing. Avoid heavy lifting, strenuous activities, or exercises that strain the belly muscles while you are recovering. Ask a family member or friend for help with housework, cooking, and shopping. This care sheet gives you a general idea about how long it will take for you to recover. But each person recovers at a different pace. Follow the steps below to get better as quickly as possible. How can you care for yourself at home? Activity    · Rest when you feel tired. Getting enough sleep will help you recover.     · Try to walk each day. Start by walking a little more than you did the day before. Bit by bit, increase the amount you walk. Walking boosts blood flow and helps prevent pneumonia, constipation, and blood clots.     · Avoid strenuous activities, such as bicycle riding, jogging, weightlifting, and aerobic exercise, for 6 weeks or until your doctor says it is okay.     · Until your doctor says it is okay, do not lift anything heavier than your baby.     · Do not do sit-ups or other exercises that strain the belly muscles for 6 weeks or until your doctor says it is okay.     · Hold a pillow over your incision when you cough or take deep breaths. This will support your belly and decrease your pain.     · You may shower as usual. Pat the incision dry when you are done.     · You will have some vaginal bleeding. Wear sanitary pads.  Do not douche or use tampons until your doctor says it is okay.     · Ask your doctor when you can drive again.     · You will probably need to take at least 6 weeks off work. It depends on the type of work you do and how you feel.     · Ask your doctor when it is okay for you to have sex. Diet    · You can eat your normal diet. If your stomach is upset, try bland, low-fat foods like plain rice, broiled chicken, toast, and yogurt.     · Drink plenty of fluids (unless your doctor tells you not to).     · You may notice that your bowel movements are not regular right after your surgery. This is common. Try to avoid constipation and straining with bowel movements. You may want to take a fiber supplement every day. If you have not had a bowel movement after a couple of days, ask your doctor about taking a mild laxative.     · If you are breastfeeding, limit alcohol. Alcohol can cause a lack of energy and other health problems for the baby when a breastfeeding woman drinks heavily. It can also get in the way of a mom's ability to feed her baby or to care for the child in other ways. There isn't a lot of research about exactly how much alcohol can harm a baby. Having no alcohol is the safest choice for your baby. If you choose to have a drink now and then, have only one drink, and limit the number of occasions that you have a drink. Wait to breastfeed at least 2 hours after you have a drink to reduce the amount of alcohol the baby may get in the milk. Medicines    · Your doctor will tell you if and when you can restart your medicines. He or she will also give you instructions about taking any new medicines.     · If you take blood thinners, such as warfarin (Coumadin), clopidogrel (Plavix), or aspirin, be sure to talk to your doctor. He or she will tell you if and when to start taking those medicines again. Make sure that you understand exactly what your doctor wants you to do.     · Take pain medicines exactly as directed. ? If the doctor gave you a prescription medicine for pain, take it as prescribed. ?  If you are not taking a prescription pain medicine, ask your doctor if you can take an over-the-counter medicine.     · If you think your pain medicine is making you sick to your stomach:  ? Take your medicine after meals (unless your doctor has told you not to). ? Ask your doctor for a different pain medicine.     · If your doctor prescribed antibiotics, take them as directed. Do not stop taking them just because you feel better. You need to take the full course of antibiotics. Incision care    · If you have strips of tape on the incision, leave the tape on for a week or until it falls off.     · Wash the area daily with warm, soapy water, and pat it dry. Don't use hydrogen peroxide or alcohol, which can slow healing. You may cover the area with a gauze bandage if it weeps or rubs against clothing. Change the bandage every day.     · Keep the area clean and dry. Other instructions    · If you breastfeed your baby, you may be more comfortable while you are healing if you place the baby so that he or she is not resting on your belly. Try tucking your baby under your arm, with his or her body along the side you will be feeding on. Support your baby's upper body with your arm. With that hand you can control your baby's head to bring his or her mouth to your breast. This is sometimes called the football hold. Follow-up care is a key part of your treatment and safety. Be sure to make and go to all appointments, and call your doctor if you are having problems. It's also a good idea to know your test results and keep a list of the medicines you take. When should you call for help? Call 911 anytime you think you may need emergency care.  For example, call if:    · You passed out (lost consciousness).     · You have chest pain, are short of breath, or cough up blood.    Call your doctor now or seek immediate medical care if:    · You have pain that does not get better after you take pain medicine.     · You have severe vaginal bleeding.     · You are dizzy or lightheaded, or you feel like you may faint.     · You have new or worse pain in your belly or pelvis.     · You have loose stitches, or your incision comes open.     · You have symptoms of infection, such as:  ? Increased pain, swelling, warmth, or redness. ? Red streaks leading from the incision. ? Pus draining from the incision. ? A fever.     · You have symptoms of a blood clot in your leg (called a deep vein thrombosis), such as:  ? Pain in your calf, back of the knee, thigh, or groin. ? Redness and swelling in your leg or groin.    Watch closely for changes in your health, and be sure to contact your doctor if:    · You do not get better as expected. Where can you learn more? Go to http://micaela-dariel.info/. Enter M806 in the search box to learn more about \" Section: What to Expect at Home. \"  Current as of: 2018  Content Version: 11.9  © 3917-4267 [employer]. Care instructions adapted under license by [employer] (which disclaims liability or warranty for this information). If you have questions about a medical condition or this instruction, always ask your healthcare professional. [address] any warranty or liability for your use of this information.

## 2019-01-29 NOTE — PROGRESS NOTES
Pt states she has not been able to void yet for UA and culture. Encouraged PO fluids and to notify 7P RN when urine collected.

## 2019-01-29 NOTE — PROGRESS NOTES
RN to room to collect urine for UA and culture. Patient sitting on bed and states \"i'm about to go try to go now\". RN educates patient on need for urine for a better understanding on slight elevation in temperature. Patient verbalizes understanding. Room is full of visitors at this time including patient's mother.

## 2019-01-29 NOTE — DISCHARGE SUMMARY
Obstetrical Discharge Summary     Name: Bonny Jones MRN: [id number]  SSN: [id number]    YOB: 1995  Age: 24 y.o. Sex: female      Allergies: Morphine; Shellfish containing products; Shellfish derived; Codeine; Doxycycline; Lortab [hydrocodone-acetaminophen]; Pcn [penicillins]; Strawberry; Tramadol; and Zithromax [azithromycin]    Admit Date: 2019    Discharge Date: 2019     Admitting Physician: Katya Mena MD     Attending Physician:  Nancy Cuenca MD     * Admission Diagnoses: triage  Pregnancy  Pregnancy    * Discharge Diagnoses:   Information for the patient's :  Tony Loza [[id number]]   Delivery of a 1.59 kg male infant via , Low Transverse on 2019 at 3:57 PM  by . Apgars were 7 and 8. Additional Diagnoses:   Hospital Problems as of 2019 Date Reviewed: 2019          Codes Class Noted - Resolved POA    Delivery of pregnancy by  section ICD-10-CM: O82  ICD-9-CM: 669.70  2019 - Present Unknown        Pregnancy ICD-10-CM: Z34.90  ICD-9-CM: V22.2  2019 - Present Unknown        Anxiety in pregnancy in third trimester, antepartum ICD-10-CM: O99.343, F41.9  ICD-9-CM: 648.43, 300.00  2019 - Present Yes    Overview Signed 2019 11:24 AM by Hortensia Hernandez RN     Pt's  recently told pt he wanted a divorce-19  Pt upset, very anxious, not sleeping. Pt and husb have 3 children they have fostering and are planning to adopt.   19 at [facility]:  We took pt out of work today due to IUGR and shortened cervix  · Note given for pt to come out of work  · Seeing counselor next week   · Use benedryl, melatonin or unisom to sleep             Poor fetal growth affecting management of mother in third trimester ICD-10-CM: O36.5930  ICD-9-CM: 656.53  2019 - Present Yes    Overview Addendum 2019  4:11 PM by Venkata Batista MD       2019 at [facility]: [place], reassuring fetal status;  AC 7%, overall 28%, DEMETRIA 13 cm, UA Dopplers WNL, BPP 8/8  · Decrease activity and increase calories    See Cervical Insufficiency Problem Overview               Cervical cerclage suture present in second trimester ICD-10-CM: O34.32  ICD-9-CM: 654.53  2018 - Present Yes    Overview Signed 2018  2:54 PM by Domingo Saenz RN     See Cervical Insufficiency Overview             High-risk pregnancy in third trimester ICD-10-CM: O09.93  ICD-9-CM: V23.9  2018 - Present Yes    Overview Addendum 2018  3:13 PM by Domingo Saenz RN     2018 at [facility]:  Normal NT and nasal bone. Declines genetic testing. Had [id number] drawn at [facility] x3, Male fetus. Genetic counseling done by MD.  2018 at [facility]:  Normal Anatomy/Fetal Echo with exception of bilateral fetal hydro. 2018 at [facility]:  Appropriate fetal growth. AC 36%, overall 49%, DEMETRIA 14.3 cm. See Fetal Hydronephrosis Overview             * (Principal) History of  delivery, currently pregnant in third trimester ICD-10-CM: O09.213  ICD-9-CM: V23.41  2018 - Present Yes    Overview Addendum 2019  4:10 PM by Nel Gramajo MD     2018 at [facility]:  Patient has a history of  labor in prior pregnancy. She delivered at 25 weeks at [facility] 2015. Baby lived 3 days. She was found to have shortened cervix at regular OB visit at 24 weeks, then went into labor, failed tocolysis. Unclear if cervical insufficiency versus PTL. I explained to patient and  that with this kind of history, 50% of patients may go to term without any treatment. I discussed with her at length about possible cervical incompetence and that at this time we cannot be sure that she will deliver early. In an effort to prevent recurrent  birth, I offered cervical surveillance in pregnancy.  If cervical shortening occurs in pregnancy, a cervical cerclage may be placed prior to 24 weeks, alternatively, a vaginal pessary may be used for cervical support. I also offered prophylactic cerclage placement. They do not want any chance of loss again and want cerclage placed now. Cerclage scheduled for 9/10 at 9am.    See Cervical Insufficiency Problem Overview                 Hypothyroidism affecting pregnancy in third trimester ICD-10-CM: O99.283, E03.9  ICD-9-CM: 648.13, 244.9  2018 - Present Yes    Overview Addendum 2019 11:21 AM by Delicia Coles RN      TSH 4.62, FT4 1.41  2018 at [facility]:  Taking synthroid 50mcg daily  10/8/2018 at [facility]:  10/3 TSH 2.610; taking synthroid 50mcg daily. 2018 at [facility]:  Taking Synthroid 75 mcg daily; this dose x ~ 1 month  2018 at [facility]:  Synthroid 75mcg daily. 2018 at [facility]:  Taking Synthroid 75 mcg daily. 2019 at [facility]: Taking synthroid 75mcg daily    · Recommend evaluation of TSH and FT4 each trimester. Goal to keep TSH <2-2.5. · If medication change, repeat TFTs in 4 weeks. · If poorly controlled thyroid, recommend growth evaluation in 3rd trimester. Lab Results   Component Value Date/Time    ABO/Rh(D) AB POSITIVE 2015 11:55 AM    Rubella, External non-immune 2018    ABO,Rh AB+ 2018      Immunization History   Administered Date(s) Administered    DTP 1994, 1994, 1994    DTaP 1998    DTaP-Hib 1995    HPV (Quad) 2010, 2011    Hep A Vaccine 2 Dose Schedule (Ped/Adol) 2010, 2011    Hep B Vaccine 1994, 1994, 1994    Hib (HbOC) 1994    IPV 1994, 1994, 1995, 1998    MMR 1995, 1998    Meningococcal (MCV4P) Vaccine 2010    Tdap 2010       * Procedures:   Procedure(s):   SECTION  CERCLAGE CERVICAL REMOVAL           * Discharge Condition: good and stable    * Hospital Course: Normal hospital course following the delivery.     * Disposition: Home    Discharge Medications:   Current Discharge Medication List      START taking these medications    Details   escitalopram oxalate (LEXAPRO) 10 mg tablet Take 1 Tab by mouth daily. Qty: 30 Tab, Refills: 3      LORazepam (ATIVAN) 1 mg tablet Take 0.5-1 Tabs by mouth every twelve (12) hours as needed for Anxiety. Max Daily Amount: 2 mg. Qty: 20 Tab, Refills: 0    Associated Diagnoses: Anxiety in pregnancy in third trimester, antepartum      oxyCODONE-acetaminophen (PERCOCET 7.5) 7.5-325 mg per tablet Take 1-2 Tabs by mouth every six (6) hours as needed. Max Daily Amount: 8 Tabs. Qty: 30 Tab, Refills: 0    Associated Diagnoses: Delivery of pregnancy by  section      ibuprofen (MOTRIN) 600 mg tablet Take 1 Tab by mouth every six (6) hours as needed for Pain. Qty: 20 Tab, Refills: 1    Associated Diagnoses: Delivery of pregnancy by  section         CONTINUE these medications which have NOT CHANGED    Details   PROGESTERONE IM by IntraMUSCular route every seven (7) days. acetaminophen (TYLENOL EXTRA STRENGTH) 500 mg tablet Take  by mouth every six (6) hours as needed for Pain. aspirin 81 mg chewable tablet Take 81 mg by mouth daily. levothyroxine (SYNTHROID) 75 mcg tablet Take  by mouth Daily (before breakfast). PRENATAL VIT CALC,IRON,FOLIC (PRENATAL #2 PO) Take  by mouth. * Follow-up Care/Patient Instructions:   Activity: Activity as tolerated  Diet: Regular Diet  Wound Care: As directed    Follow-up Information     Follow up With Specialties Details Why Contact Info    Yamileth Soria MD [place] [id number] [facility],[address] Floor, [address]  [phone]      Floyd Salguero MD Obstetrics & Gynecology, Gynecology, Obstetrics Schedule an appointment as soon as possible for a visit in 7 weeks  [address] [place] [address]. [address] [place] [address]  [phone]             Signed By:  Rickie Tidwell MD     2019                                                Post-Operative Day Number 3 Progress/Discharge Note    Patient doing well post-op day 3 from  delivery without significant complaints. Pain controlled on current medication. Voiding without difficulty, normal lochia. Vitals:    Patient Vitals for the past 8 hrs:   BP Temp Pulse Resp SpO2   19 0700 124/85 98.6 °F (37 °C) 96 16 98 %     Temp (24hrs), Av.6 °F (37 °C), Min:97.7 °F (36.5 °C), Max:100 °F (37.8 °C)      Vital signs stable, afebrile. Exam:  Patient without distress. Abdomen soft, fundus firm at level of umbilicus, non tender. Incision dry and                      clean without erythema. Lower extremities are negative for swelling, cords or tenderness. Lab/Data Review: All lab results for the last 24 hours reviewed. Assessment and Plan:  Patient appears to be having uncomplicated post- course. Continue routine post-op care and maternal education. Plan discharge for today with follow up in our office in 1-2 weeks.

## 2019-01-29 NOTE — PROGRESS NOTES
SW follow-up with patient.  is familiar with patient as initial assessment was completed on Friday, 1/25/19 (please see previous documentation). Patient delivered baby Enzo on 1/26/18 at [address] [facility]. Patient is under a significant amount of stress due to marital conflict and lack of support/involvement from . EPDS score = 11. Patient was started on Lexapro by OB on 1/28/19. Discussed increased risk of experiencing postpartum depression/anxiety due to multiple stressors. Patient is unsure if she will stay in the NICU with Enzo after discharge or go home. She previously lost a 25 week daughter in 1 and is anxious about leaving Enzo alone. Patient reports having a strong support system as well as consistent transportation to/from hospital.  Emotional support offered. Patient states that she's okay with her /FOB Tushar Weeks) coming to the hospital to visit baby Enzo. Patient reports that Monisha Cloud is upset that he doesn't have the second wrist band for Enzo as her mother has this wristband. Patient denied any current needs from . Patient has this 's contact information should any needs/questions arise. SW will continue to follow. Ji [address]

## 2019-01-29 NOTE — PROGRESS NOTES
Assessment complete. Patient still has not felt the urge to void. Fundal check, -1. No bleeding noted. Patient encouraged to call RN once she has voided for urine sample. Has toilettes and urine specimen cup in bathroom.

## 2019-01-29 NOTE — PROGRESS NOTES
Discharge instructions completed. Patient voiced understanding. Prescriptions given. Patient verified with lactation consultant regarding prescription for Ativan and breastfeeding. Patient discharged home. Ambulating.

## 2019-02-01 ENCOUNTER — HOSPITAL ENCOUNTER (EMERGENCY)
Age: 24
Discharge: HOME | End: 2019-02-01
Attending: EMERGENCY MEDICINE
Payer: COMMERCIAL

## 2019-02-01 VITALS
RESPIRATION RATE: 18 BRPM | SYSTOLIC BLOOD PRESSURE: 154 MMHG | TEMPERATURE: 98.5 F | OXYGEN SATURATION: 95 % | HEART RATE: 104 BPM | DIASTOLIC BLOOD PRESSURE: 96 MMHG

## 2019-02-01 DIAGNOSIS — N30.90 CYSTITIS: Primary | ICD-10-CM

## 2019-02-01 LAB
ALBUMIN SERPL-MCNC: 2.3 G/DL (ref 3.5–5)
ALBUMIN/GLOB SERPL: 0.5 {RATIO}
ALP SERPL-CCNC: 139 U/L (ref 50–130)
ALT SERPL-CCNC: 21 U/L (ref 12–65)
ANION GAP SERPL CALC-SCNC: 10 MMOL/L
APPEARANCE UR: ABNORMAL
AST SERPL-CCNC: 15 U/L (ref 15–37)
BACTERIA URNS QL MICRO: ABNORMAL /HPF
BASOPHILS # BLD: 0.1 K/UL (ref 0–0.2)
BASOPHILS NFR BLD: 1 % (ref 0–2)
BILIRUB SERPL-MCNC: 0.2 MG/DL (ref 0.2–1.1)
BILIRUB UR QL: NEGATIVE
BUN SERPL-MCNC: 12 MG/DL (ref 6–23)
CALCIUM SERPL-MCNC: 8.2 MG/DL (ref 8.3–10.4)
CHLORIDE SERPL-SCNC: 109 MMOL/L (ref 98–107)
CO2 SERPL-SCNC: 24 MMOL/L (ref 21–32)
COLOR UR: YELLOW
CREAT SERPL-MCNC: 0.92 MG/DL (ref 0.6–1)
DIFFERENTIAL METHOD BLD: ABNORMAL
EOSINOPHIL # BLD: 0.3 K/UL (ref 0–0.8)
EOSINOPHIL NFR BLD: 2 % (ref 0.5–7.8)
EPI CELLS #/AREA URNS HPF: ABNORMAL /HPF
ERYTHROCYTE [DISTWIDTH] IN BLOOD BY AUTOMATED COUNT: 13.5 % (ref 11.9–14.6)
GLOBULIN SER CALC-MCNC: 4.7 G/DL (ref 2.3–3.5)
GLUCOSE SERPL-MCNC: 95 MG/DL (ref 65–100)
GLUCOSE UR STRIP.AUTO-MCNC: NEGATIVE MG/DL
HCT VFR BLD AUTO: 35.1 % (ref 35.8–46.3)
HGB BLD-MCNC: 11.8 G/DL (ref 11.7–15.4)
HGB UR QL STRIP: ABNORMAL
IMM GRANULOCYTES # BLD AUTO: 0.1 K/UL (ref 0–0.5)
IMM GRANULOCYTES NFR BLD AUTO: 1 % (ref 0–5)
KETONES UR QL STRIP.AUTO: NEGATIVE MG/DL
LEUKOCYTE ESTERASE UR QL STRIP.AUTO: ABNORMAL
LYMPHOCYTES # BLD: 2.2 K/UL (ref 0.5–4.6)
LYMPHOCYTES NFR BLD: 19 % (ref 13–44)
MCH RBC QN AUTO: 31.1 PG (ref 26.1–32.9)
MCHC RBC AUTO-ENTMCNC: 33.6 G/DL (ref 31.4–35)
MCV RBC AUTO: 92.6 FL (ref 79.6–97.8)
MONOCYTES # BLD: 1 K/UL (ref 0.1–1.3)
MONOCYTES NFR BLD: 9 % (ref 4–12)
NEUTS SEG # BLD: 8.1 K/UL (ref 1.7–8.2)
NEUTS SEG NFR BLD: 70 % (ref 43–78)
NITRITE UR QL STRIP.AUTO: NEGATIVE
NRBC # BLD: 0 K/UL (ref 0–0.2)
PH UR STRIP: 5.5 [PH] (ref 5–9)
PLATELET # BLD AUTO: 319 K/UL (ref 150–450)
PMV BLD AUTO: 11.1 FL (ref 9.4–12.3)
POTASSIUM SERPL-SCNC: 3.5 MMOL/L (ref 3.5–5.1)
PROT SERPL-MCNC: 7 G/DL
PROT UR STRIP-MCNC: 30 MG/DL
RBC # BLD AUTO: 3.79 M/UL (ref 4.05–5.2)
RBC #/AREA URNS HPF: ABNORMAL /HPF
SODIUM SERPL-SCNC: 143 MMOL/L (ref 136–145)
SP GR UR REFRACTOMETRY: 1.02 (ref 1–1.02)
UROBILINOGEN UR QL STRIP.AUTO: 0.2 EU/DL (ref 0.2–1)
WBC # BLD AUTO: 11.6 K/UL (ref 4.3–11.1)
WBC URNS QL MICRO: >100 /HPF

## 2019-02-01 PROCEDURE — 87086 URINE CULTURE/COLONY COUNT: CPT

## 2019-02-01 PROCEDURE — 85025 COMPLETE CBC W/AUTO DIFF WBC: CPT

## 2019-02-01 PROCEDURE — 81001 URINALYSIS AUTO W/SCOPE: CPT

## 2019-02-01 PROCEDURE — 87088 URINE BACTERIA CULTURE: CPT

## 2019-02-01 PROCEDURE — 87186 SC STD MICRODIL/AGAR DIL: CPT

## 2019-02-01 PROCEDURE — 99283 EMERGENCY DEPT VISIT LOW MDM: CPT | Performed by: EMERGENCY MEDICINE

## 2019-02-01 PROCEDURE — 80053 COMPREHEN METABOLIC PANEL: CPT

## 2019-02-01 RX ORDER — NITROFURANTOIN MACROCRYSTALS 50 MG/1
100 CAPSULE ORAL
Status: DISCONTINUED | OUTPATIENT
Start: 2019-02-01 | End: 2019-02-02 | Stop reason: HOSPADM

## 2019-02-01 RX ORDER — NITROFURANTOIN 25; 75 MG/1; MG/1
100 CAPSULE ORAL 2 TIMES DAILY
Qty: 14 CAP | Refills: 0 | Status: SHIPPED | OUTPATIENT
Start: 2019-02-01 | End: 2019-02-08

## 2019-02-01 RX ORDER — HYDROMORPHONE HYDROCHLORIDE 1 MG/ML
0.5 INJECTION, SOLUTION INTRAMUSCULAR; INTRAVENOUS; SUBCUTANEOUS ONCE
Status: DISCONTINUED | OUTPATIENT
Start: 2019-02-01 | End: 2019-02-01

## 2019-02-02 NOTE — ED NOTES
I have reviewed discharge instructions with the patient. The patient verbalized understanding. Patient left ED via Discharge Method: wheelchair to home with family. Opportunity for questions and clarification provided. Patient given 1 scripts. To continue your aftercare when you leave the hospital, you may receive an automated call from our care team to check in on how you are doing. This is a free service and part of our promise to provide the best care and service to meet your aftercare needs.  If you have questions, or wish to unsubscribe from this service please call [phone]. Thank you for Choosing our [facility] Emergency Department.

## 2019-02-02 NOTE — ED PROVIDER NOTES
78-year-old female presenting for sudden onset left lower quadrant abdominal pain. Her symptoms started abruptly and felt like a burning hot poker stabbing into her left lower abdomen. This is on the left side of an incision from a  performed a week ago. She is staying in the hospital as her baby was premature and is currently in the NICU. The pain started upstairs and she was directed to come down here for further evaluation. Just prior to the pain she experienced some drainage from the incision site that was on the right side and it was more of a clear liquid-type material.  The pain then started thereafter. It lasted approximately an hour. When it was at its worst it was severe causing the patient to double over and become nauseated produced tears. She felt like she couldn't breathe while going on. She now states that the severe pain is gone away and she has just the residual pain from her recent surgery. She denies any change in vaginal discharge. She is still having some bleeding but it is not malodorous. She's had no fevers. She's had no chest pain or shortness of breath. she did take some ibuprofen and now she feels back to normal 
 
 
The history is provided by the patient. Post OP Complication This is a new problem. The current episode started less than 1 hour ago. The problem has been resolved. Associated symptoms include abdominal pain. Pertinent negatives include no chest pain, no headaches and no shortness of breath. Nothing aggravates the symptoms. Nothing relieves the symptoms. She has tried acetaminophen for the symptoms. The treatment provided significant relief. Past Medical History:  
Diagnosis Date  Allergic reaction to food 2017  Anemia  Asthma   
 proair as needed; uses rarely  Broken bones \"lots\" falls often  Fetal hydronephrosis in pregnancy, antepartum condition 10/8/2018  Hip dysplasia   
 bilateral and knee dysplasia  Hypothyroid in pregnancy, antepartum, first trimester 9/6/2018  Obesity affecting pregnancy in first trimester 9/6/2018  Ovarian cyst   
 cystadenofibroma  Unspecified adverse effect of anesthesia 2010 \"Went into cardiac arrest after waking up from anesthesia\" Past Surgical History:  
Procedure Laterality Date  HX KNEE ARTHROSCOPY Left MCL; PCL  
 HX KNEE ARTHROSCOPY Right 1/5/15 MCL; PCL, filled micro fractures  HX PELVIC LAPAROSCOPY Right 5/6/2015  
 right ovarian cystectomy,chromopertubation,pelvic washings  HX WISDOM TEETH EXTRACTION Family History:  
Problem Relation Age of Onset  Cancer Mother   
     cervical  
 Endometriosis Mother  Hypertension Mother  Diabetes Mother   
     prediabetes  Diabetes Maternal Grandmother  Breast Cancer Maternal Grandmother [de-identified]  
 Ovarian Cancer Maternal Grandmother 36  Endometriosis Maternal Grandmother  Diabetes Maternal Grandfather  Diabetes Paternal Grandmother  Cancer Paternal Grandmother   
     uterine  Endometriosis Paternal Grandmother  Diabetes Paternal Grandfather  Cancer Paternal Grandfather   
     small cell ca  Other Paternal Grandfather   
     polio  Diabetes Maternal Aunt Social History Socioeconomic History  Marital status:  Spouse name: Not on file  Number of children: Not on file  Years of education: Not on file  Highest education level: Not on file Social Needs  Financial resource strain: Not on file  Food insecurity - worry: Not on file  Food insecurity - inability: Not on file  Transportation needs - medical: Not on file  Transportation needs - non-medical: Not on file Occupational History  Not on file Tobacco Use  Smoking status: Never Smoker  Smokeless tobacco: Never Used Substance and Sexual Activity  Alcohol use: No  
  Alcohol/week: 0.0 oz  Drug use: No  
 Sexual activity: Yes  
 Partners: Male Birth control/protection: Condom Other Topics Concern  Not on file Social History Narrative  Not on file ALLERGIES: Morphine; Shellfish containing products; Shellfish derived; Codeine; Doxycycline; Lortab [hydrocodone-acetaminophen]; Pcn [penicillins]; Strawberry; Tramadol; and Zithromax [azithromycin] Review of Systems Respiratory: Negative for shortness of breath. Cardiovascular: Negative for chest pain. Gastrointestinal: Positive for abdominal pain. Neurological: Negative for headaches. All other systems reviewed and are negative. Vitals:  
 02/01/19 1915 BP: (!) 154/96 Pulse: (!) 104 Resp: 18 Temp: 98.5 °F (36.9 °C) SpO2: 95% Physical Exam  
Constitutional: She is oriented to person, place, and time. She appears well-developed and well-nourished. HENT:  
Head: Normocephalic and atraumatic. Eyes: Conjunctivae are normal. Pupils are equal, round, and reactive to light. Neck: Neck supple. Cardiovascular: Normal rate and regular rhythm. Pulmonary/Chest: Effort normal and breath sounds normal.  
Abdominal: Soft. Bowel sounds are normal.  
Low incision site is clean, dry, intact, no drainage, no openings, no fluctuance, mild surrounding bruising but otherwise unremarkable. Musculoskeletal: Normal range of motion. Neurological: She is alert and oriented to person, place, and time. Skin: Skin is warm and dry. Nursing note and vitals reviewed. MDM Number of Diagnoses or Management Options Cystitis:  
Diagnosis management comments: 21-year-old female presenting for sudden onset left lower quadrant abdominal pain in the setting of recent surgery. Differential includes urinary tract infection, surgical site infection, ovarian cyst, kidney stone, intra-abdominal abscess, hernia Patient's symptoms seemed to resolved is unclear exactly what caused them. We will get an IV, labs, pain control as needed, and imaging is needed. Amount and/or Complexity of Data Reviewed Clinical lab tests: ordered and reviewed (Reviewed the patient's labs and they're essentially unremarkable. Waiting for urinalysis. Showed a mild leukocytosis of 11,000urinalysis is consistent with infection) Risk of Complications, Morbidity, and/or Mortality Presenting problems: moderate Diagnostic procedures: moderate Management options: moderate General comments: The patient's symptomsseemed to have resolved. Unclear exactly what caused it. Patient is refusing any further imaging or testing. I did encourage her to give us a urinalysis. She does have a history of PCO S and it could be that she ruptured ovarian cyst.  Her vital signs are unremarkable, she is afebrile, labs are reassuring, and her pain is gone. Patient Progress Patient progress: improved Procedures

## 2019-02-02 NOTE — ED NOTES
Pt swearing in triage. She is upset that she cannot be taken straight back. States \"Get me the fuck out of here before I smack her! \" meaning triage RN. They said they were leaving to take pt somewhere else.

## 2019-02-02 NOTE — DISCHARGE INSTRUCTIONS
Urinalysis is consistent with a urinary tract infection. This could be the source of her pain so we will treat with antibiotics.   He is on a boxer safe for pregnancy and in breast milk

## 2019-02-04 ENCOUNTER — HOSPITAL ENCOUNTER (EMERGENCY)
Age: 24
Discharge: HOME | End: 2019-02-05
Attending: EMERGENCY MEDICINE
Payer: COMMERCIAL

## 2019-02-04 DIAGNOSIS — T81.40XA POSTOPERATIVE INFECTION, UNSPECIFIED TYPE, INITIAL ENCOUNTER: Primary | ICD-10-CM

## 2019-02-04 LAB
BACTERIA SPEC CULT: ABNORMAL
BACTERIA SPEC CULT: ABNORMAL
SERVICE CMNT-IMP: ABNORMAL

## 2019-02-04 PROCEDURE — 99283 EMERGENCY DEPT VISIT LOW MDM: CPT | Performed by: EMERGENCY MEDICINE

## 2019-02-05 VITALS
OXYGEN SATURATION: 98 % | RESPIRATION RATE: 18 BRPM | DIASTOLIC BLOOD PRESSURE: 95 MMHG | HEART RATE: 78 BPM | HEIGHT: 62 IN | BODY MASS INDEX: 42.8 KG/M2 | SYSTOLIC BLOOD PRESSURE: 124 MMHG | TEMPERATURE: 99.3 F

## 2019-02-05 LAB
BACTERIA SPEC CULT: ABNORMAL
BACTERIA SPEC CULT: ABNORMAL
SERVICE CMNT-IMP: ABNORMAL

## 2019-02-05 NOTE — DISCHARGE INSTRUCTIONS
Follow-up with Dr. Irene Lamb, call his office to make an appointment. Return to the emergency department if your symptoms worsen.

## 2019-02-05 NOTE — ED NOTES
I have reviewed discharge instructions with the patient. The patient verbalized understanding. Patient left ED via Discharge Method: ambulatory to Home with family Opportunity for questions and clarification provided. Patient given 0 scripts. To continue your aftercare when you leave the hospital, you may receive an automated call from our care team to check in on how you are doing. This is a free service and part of our promise to provide the best care and service to meet your aftercare needs.  If you have questions, or wish to unsubscribe from this service please call [phone]. Thank you for Choosing our [facility] Emergency Department.

## 2019-02-05 NOTE — ED TRIAGE NOTES
Pt visiting infant nicu and told nurses she was having d/c from c section wound , nicu nurse said to come to ed , pt has not other c/o

## 2019-02-05 NOTE — ED PROVIDER NOTES
Patient states she had a precipitous  section approximately eight days ago for fetal distress. She has been doing well since that time but has had some swelling and some slight drainage at her incision site has been going on for a couple days. She was seen here a couple days ago, told everything looked good and was prescribed Macrobid for a urinary tract infection. Since that time, she states that her drainage has worsened and has turned purulent. She denies any abdominal pain or vomiting, denies any fever. She states she has not had much of an appetite but that has been going on \"for a while\". Elements of this note were made using speech recognition software. As such, errors of speech recognition may occur. Past Medical History:  
Diagnosis Date  Allergic reaction to food 2017  Anemia  Asthma   
 proair as needed; uses rarely  Broken bones \"lots\" falls often  Fetal hydronephrosis in pregnancy, antepartum condition 10/8/2018  Hip dysplasia   
 bilateral and knee dysplasia  Hypothyroid in pregnancy, antepartum, first trimester 2018  Obesity affecting pregnancy in first trimester 2018  Ovarian cyst   
 cystadenofibroma  Unspecified adverse effect of anesthesia  \"Went into cardiac arrest after waking up from anesthesia\" Past Surgical History:  
Procedure Laterality Date  HX KNEE ARTHROSCOPY Left MCL; PCL  
 HX KNEE ARTHROSCOPY Right 1/5/15 MCL; PCL, filled micro fractures  HX PELVIC LAPAROSCOPY Right 2015  
 right ovarian cystectomy,chromopertubation,pelvic washings  HX WISDOM TEETH EXTRACTION Family History:  
Problem Relation Age of Onset  Cancer Mother   
     cervical  
 Endometriosis Mother  Hypertension Mother  Diabetes Mother   
     prediabetes  Diabetes Maternal Grandmother  Breast Cancer Maternal Grandmother [de-identified]  
 Ovarian Cancer Maternal Grandmother 36  
  Endometriosis Maternal Grandmother  Diabetes Maternal Grandfather  Diabetes Paternal Grandmother  Cancer Paternal Grandmother   
     uterine  Endometriosis Paternal Grandmother  Diabetes Paternal Grandfather  Cancer Paternal Grandfather   
     small cell ca  Other Paternal Grandfather   
     polio  Diabetes Maternal Aunt Social History Socioeconomic History  Marital status:  Spouse name: Not on file  Number of children: Not on file  Years of education: Not on file  Highest education level: Not on file Social Needs  Financial resource strain: Not on file  Food insecurity - worry: Not on file  Food insecurity - inability: Not on file  Transportation needs - medical: Not on file  Transportation needs - non-medical: Not on file Occupational History  Not on file Tobacco Use  Smoking status: Never Smoker  Smokeless tobacco: Never Used Substance and Sexual Activity  Alcohol use: No  
  Alcohol/week: 0.0 oz  Drug use: No  
 Sexual activity: Yes  
  Partners: Male Birth control/protection: Condom Other Topics Concern  Not on file Social History Narrative  Not on file ALLERGIES: Morphine; Shellfish containing products; Shellfish derived; Codeine; Doxycycline; Lortab [hydrocodone-acetaminophen]; Pcn [penicillins]; Strawberry; Tramadol; and Zithromax [azithromycin] Review of Systems Constitutional: Negative for chills and fever. Gastrointestinal: Negative for nausea and vomiting. All other systems reviewed and are negative. Vitals:  
 02/04/19 2203 BP: 118/89 Pulse: 74 Resp: 16 Temp: 99.3 °F (37.4 °C) Height: 5' 2\" (1.575 m) Physical Exam  
Constitutional: She is oriented to person, place, and time. She appears well-developed and well-nourished. HENT:  
Head: Normocephalic and atraumatic.   
Eyes: Conjunctivae are normal. Pupils are equal, round, and reactive to light.  
Neck: Normal range of motion. Neck supple. Abdominal: Soft. Bowel sounds are normal.  
 
 
Low-transverse incisional site intact except for a small area on the right with some purulent drainage. There is no palpable fluid collection, no tenderness to the area. There is no cellulitis Musculoskeletal: She exhibits no edema or tenderness. Neurological: She is alert and oriented to person, place, and time. Skin: Skin is warm and dry. Psychiatric: She has a normal mood and affect. Her behavior is normal.  
Nursing note and vitals reviewed. MDM Number of Diagnoses or Management Options Postoperative infection, unspecified type, initial encounter:  
Diagnosis management comments: 12:08 AM reviewed her labs from her previous visit, unremarkable blood work. She did have urinary tract infection for which she is taking Macrobid. 12:26 AM I spoke to Dr. Mani Alanis, discussed details of case. She has an appointment next week, he said that she can follow up before then by simply calling the office. She is already on an antibiotic which will cover MRSA Amount and/or Complexity of Data Reviewed Decide to obtain previous medical records or to obtain history from someone other than the patient: yes Review and summarize past medical records: yes Discuss the patient with other providers: yes Risk of Complications, Morbidity, and/or Mortality Presenting problems: moderate Diagnostic procedures: moderate Management options: moderate Patient Progress Patient progress: stable Procedures

## 2019-02-06 NOTE — ADT AUTH CERT NOTES
Obstetric and Gynecologic Disease [address] Day 6 (2019) by Sienna Mason RN Review Entered Review Status 2019 16:09 Completed Criteria Review Care Day: 6 Care Date: 2019 Level of Care: Inpatient Floor Guideline Day 2 Level Of Care (X) Floor 2019 4:09 PM EST by Rosa Fowler Clinical Status  
(X) * No ICU or intermediate care needs Interventions (X) Inpatient interventions continue * Milestone Additional Notes REVIEW 2019 OBSTETRICS:  
Patient doing well post-op day 2 from  delivery without significant complaints.  Pain controlled on current medication.  Voiding without difficulty, normal lochia. Baby in SCN- 32 week delivery. He is on nasal cannula now. She has a complex social situation-  is having an affair. She is tearful. Would like to start medication for her mood. Can't sleep well. Patient Vitals for the past 8 hrs:  
  BP Temp Pulse Resp SpO2  
19 0656 129/80 98 °F (36.7 °C) 69 16 96 % 19 0545 122/80 97.8 °F (36.6 °C) 73 16 96 % RA  
  Temp (24hrs), Av.2 °F (36.8 °C), Min:97.8 °F (36.6 °C), Max:98.8 °F (37.1 °C) Vital signs stable, afebrile. LABS: NO ADDITIONAL LABS AVAILABLE FOR REVIEW. UA AND URINE CULTURE PENDING  
  
MEDS: PERCOCET 2TAB PO Q4HRS PRN X2; PERCOCET 1 TAB PO Q4HRS PRN X1; SYNTHROID PO QD; TORARDOL 10MG PO Q6HRS; LEXAPRO PO QD; COLACE 100MG PO BID Obstetric and Gynecologic Disease [address] Day 5 (2019) by Sienna Mason RN Review Entered Review Status 2019 16:05 Completed Criteria Review Care Day: 5 Care Date: 2019 Level of Care: Inpatient Floor Guideline Day 2 Level Of Care (X) Floor 2019 4:05 PM EST by Crescencio Juarez  
  Type of Bed Answer: Mother Infant Clinical Status  
(X) * No ICU or intermediate care needs Interventions (X) Inpatient interventions continue * Milestone Additional Notes REVIEW 2019 OB/GYN:  
Obstetrician: Johny Gregg MD  
Assistant: Dr. Sulema Vazquez Pre-Delivery Diagnosis:  pregnancy <37 weeks, Single fetus, Pregnancy complicated by: incompetent cervix, h/o PTD, and  labor and fetal bradycardia Post-Delivery Diagnosis: Living  infant(s) and Male Intrapartum Event: Extended fetal bradycardia Procedure: Primary Low Transverse  section  
 Presentation: Vertex    
Position:        
Resuscitation Method: Suctioning-bulb    
Meconium Stained: None    
Cord Information: 3 Vessels Complications: Prolapsed;Nuchal Cord With Compressions Cord around:    
Delayed cord clamping? No  
Cord clamped date/time:   
Disposition of Cord Blood:      
Blood Gases Sent?: Yes Placenta:  
Date/Time: 2019  3:59 PM  
Removal: Manual Removal     
Appearance: Normal;Intact  Measurements:  
Birth Weight: 1.59 kg      
  
OP NOTE:   
INTRAUTERINE PREGNANCY  SECTION FULL OP NOTE  
   
   
   
DATE OF PROCEDURE:       2019  
   
PREOPERATIVE DIAGNOSIS:         pregnancy , 32 weeks,  labor, repetitive decelerations  
   
POSTOPERATIVE DIAGNOSIS:       C/section, same as above  
   
   
PROCEDURE: Intrauterine pregnancy,  section  
   
SURGEON:     Salvador Mascorro MD, Todd Penn MD  
   
   
ANESTHESIA: general  
EBL: 835 cc  
   
COMPLICATIONS: none  
  INDICATIONS: called to see patient for repetitive decelerations. Has been on prolonged monitoring due to nonreactive nst, was hospitalized for  labor. rescusitative measures of o2, position, and iv fluid bolus were initiated with no improvement. mhr was documented via pulse ox for differentiation and fhr visualized for confirmation.  Decelerations to the 70s-80s for up to 3 minutes continued.  Decision was made to proceed directly to or for emergent delivery.  
   
 OPERATIVE PROCEDURE: Patient was placed on the operating room table in the supine position/left lateral tilt and fetal heart tones verified. The abdomen was prpped and the patient was draped. Time out was done to confirm the operating procedure, surgeon, patient and site. Once confirmed by the team, procedure was started. General anesthesia was initiated. A Pfannenstiel incision was made and carried down sharply through the skin, subcutaneous tissue, and fascia. The incision was then extended in Cristopher-Pizarro fashion. The peritoneum was  Entered bluntly. The DeLee bladder blade was then placed over the symphisis pubis. The visceroperitoneal reflection over the lower uterine segment was incised transversely and the bladder flap sharply and bluntly developed. The uterus was incised transversely, then extended bluntly.  The placenta was anterior and low on the anterior uterine wall.  Rather than going through the placenta going behind the placenta caudad allowed access to the amniotic sac which was ruptured bluntly. The the infant's head was delivered without difficulty with fundal pressure. Fluid was clear. Cord was clamped and cut. Mouth and nose were suctioned clean. The infant was given to the NICU physician present at the time of delivery. The uterus was massaged and the  placenta was expressed. The uterus was exteriorized, wrapped in a wet lap square, curetted with a dry square, and closed in a double-layered fashion with #1 vicryl then 0-monocryl.  Hemostasis appeared adequate. The cul-de-sac was then irrigated and suctioned clean. The uterus was placed in the abdomen. The gutters were irrigated and suctioned clean. The peritoneum/rectus muscles were closed with 2-0 vicryl. The fascia was closed with 0 PDS. Running 2-0 vicryl  was used to reapproximate the subcutaneous tissue incorporating Rahul's and Camper's fascia.  3-0 vicryl suture was used in a subcuticular stich and a dressing was placed.  The patient tolerated the procedure well and went to the recovery room in satisfactory condition. Counts were correct at the completion of the procedure.  Patient received prophylactic antibiotics, iv pitocin after delivery of the placenta.  
   
VS: TEMP 98.1; HR 78; /82; 02 SAT 98% RA  
  
LABS: HGB 11.5;HCT 33.2 MEDS: TORADOL INJECTION 30MG IV Q6HRS X2; PERCOCET 2TAB PO Q4HRS PRN X4; OXYCODONE IR TAB 10MG PO PRN Q3HRS X1; SYNTHROID PO QD; TORADOL 10MG PO Q6HRS Obstetric and Gynecologic Disease [address] Day 4 (2019) by Tamica Weir RN Review Entered Review Status 2019 15:58 Completed Criteria Review Care Day: 4 Care Date: 2019 Level of Care: Inpatient Floor Guideline Day 2 Level Of Care (X) Floor 2019 3:58 PM EST by Sudhir Fagan  
  Antepartum Clinical Status  
(X) * No ICU or intermediate care needs Interventions (X) Inpatient interventions continue * Milestone Additional Notes REVIEW 2019 OB/GYN:  
  
 Labor:  48 hour course of steroids to promote fetal lung maturity. Start oral Procardia Neonatology consult  
continue hospital stay per MFM due to cerclage, and +FFN with h./o PTD and demise Cervical Incompetence:  cerclage stable Vitals: Temp (24hrs), Av °F (36.7 °C), Min:97.7 °F (36.5 °C), Max:98.1 °F (36.7 °C) Patient Vitals for the past 24 hrs:  
  BP  
19 0757 117/72 LABS: NO ADDITIONAL LABS AVAILABLE FOR REVIEW MEDS: PROCARDIA XL TAB 30MG PO Q12HRS; DILAUDID 0.5MG IV X1; PERCOCET TAB PO Q4HRS PRN X1; ATIVAN 0.5MG PO Q6HRS PRN X1

## 2019-03-13 ENCOUNTER — TELEPHONE (OUTPATIENT)
Dept: CASE MANAGEMENT | Age: 24
End: 2019-03-13

## 2019-04-30 ENCOUNTER — TELEPHONE (OUTPATIENT)
Dept: CASE MANAGEMENT | Age: 24
End: 2019-04-30

## 2019-04-30 NOTE — TELEPHONE ENCOUNTER
Phone call to patient at [phone] to check-in. Patient states that she's \"doing okay\" but experiencing \"a little bit\" of depression. She remains on Lexapro at this time. Patient declined 's offer to provide counseling resources. Per patient, \"I'm doing okay right now. \"  Patient denied any additional needs from  at this time.     PatricioJi Macias [address]

## 2021-05-02 NOTE — TELEPHONE ENCOUNTER
Phone call to patient to check-in ([phone]). Patient states that things are \"going great. \"  She continues to have the support of her family and friends. She is still taking her Lexapro and can tell a difference when she doesn't take it. Per patient, \"Dr. Mone Tony is keeping a close watch on me. \"     provided e-mail address for Lamine Llamas with [facility] as patient is receiving assistance with Medicaid application for baby Enzo. Patient denied any needs from . Patient has this 's contact information should any needs/questions arise.     Jt aMldonado, [address] [facility]
No deformities present

## 2024-09-05 NOTE — LACTATION NOTE
In to follow up with mom. She was starting to pump when I walked into the room. She stated that she has been expressing 10-15ml each time she pumps. She said that pumping is going well and she has no concerns at this time.
Lactation visit. 32 week SCN infant. Mom with prior 25 week demise but did pump for that infant during her 3 days of life, mom reports she had copious milk supply arrive on day 3. Mom has been pumping already for this baby. Has pumped x 2. Getting ml's of colostrum already. Mom confident with how to use pump. Expressed understanding for how to label milk for infant in SCN. Reviewed pumping every 3 hours. Mom states for now she is pumping every 2 hours as SCN is feeding infant every 2 hours. Discussed need for 8 pump sessions in 24 hours. Anticipate milk to likely come in quickly again. Mom has a new insurance personal pump at home,will bring in for demo prior to her discharge. Discussed pumping in SCN as able. Mom doing well.  Lactation to continue to assist.  

SCN mom. Mom pumping diligently for [facility] infant. Milk starting to come in well. Mom pumping now. Obtained 50ml total. Mom has personal use pump for home use and will use hospital grade pump in [facility] when here visiting or rooming in with infant. Discussed need to pump every 3 hours. Mom doing well. Committed to pumping.  Lactation to continue to monitor and assist at the breast once infant can go to breast.  

Used breast pump and got 15ml
Quality 226: Preventive Care And Screening: Tobacco Use: Screening And Cessation Intervention: Patient screened for tobacco use and is an ex/non-smoker
Detail Level: Detailed
